# Patient Record
Sex: FEMALE | Race: OTHER | HISPANIC OR LATINO | Employment: UNEMPLOYED | URBAN - METROPOLITAN AREA
[De-identification: names, ages, dates, MRNs, and addresses within clinical notes are randomized per-mention and may not be internally consistent; named-entity substitution may affect disease eponyms.]

---

## 2017-04-18 ENCOUNTER — GENERIC CONVERSION - ENCOUNTER (OUTPATIENT)
Dept: OTHER | Facility: OTHER | Age: 47
End: 2017-04-18
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
## 2018-07-26 ENCOUNTER — HOSPITAL ENCOUNTER (EMERGENCY)
Facility: HOSPITAL | Age: 48
Discharge: HOME | End: 2018-07-26
Attending: EMERGENCY MEDICINE | Admitting: EMERGENCY MEDICINE
Payer: COMMERCIAL

## 2018-07-26 VITALS
DIASTOLIC BLOOD PRESSURE: 75 MMHG | SYSTOLIC BLOOD PRESSURE: 118 MMHG | OXYGEN SATURATION: 100 % | HEART RATE: 90 BPM | RESPIRATION RATE: 16 BRPM | TEMPERATURE: 97.8 F

## 2018-07-26 DIAGNOSIS — L02.91 ABSCESS: Primary | ICD-10-CM

## 2018-07-26 PROCEDURE — 99282 EMERGENCY DEPT VISIT SF MDM: CPT

## 2018-07-26 RX ORDER — CEPHALEXIN 500 MG/1
500 CAPSULE ORAL 4 TIMES DAILY
Qty: 40 CAPSULE | Refills: 0 | Status: SHIPPED | OUTPATIENT
Start: 2018-07-26 | End: 2018-08-05

## 2018-07-26 RX ORDER — SULFAMETHOXAZOLE AND TRIMETHOPRIM 800; 160 MG/1; MG/1
1 TABLET ORAL 2 TIMES DAILY
Qty: 20 TABLET | Refills: 0 | Status: SHIPPED | OUTPATIENT
Start: 2018-07-26 | End: 2018-08-05

## 2018-07-26 NOTE — DISCHARGE INSTRUCTIONS
Abscess   WHAT YOU NEED TO KNOW:   A warm compress may help your abscess drain  Your healthcare provider may make a cut in the abscess so it can drain  You may need surgery to remove an abscess that is on your hands or buttocks  DISCHARGE INSTRUCTIONS:   Return to the emergency department if:   · The area around your abscess becomes very painful, warm, or has red streaks  · You have a fever and chills  · Your heart is beating faster than usual      · You feel faint or confused  Contact your healthcare provider if:   · Your abscess gets bigger or does not get better  · Your abscess returns  · You have questions or concerns about your condition or care  Medicines: You may  need any of the following:  · Antibiotics  help treat a bacterial infection  · Acetaminophen  decreases pain and fever  It is available without a doctor's order  Ask how much to take and how often to take it  Follow directions  Acetaminophen can cause liver damage if not taken correctly  · NSAIDs , such as ibuprofen, help decrease swelling, pain, and fever  This medicine is available with or without a doctor's order  NSAIDs can cause stomach bleeding or kidney problems in certain people  If you take blood thinner medicine, always ask your healthcare provider if NSAIDs are safe for you  Always read the medicine label and follow directions  · Take your medicine as directed  Contact your healthcare provider if you think your medicine is not helping or if you have side effects  Tell him or her if you are allergic to any medicine  Keep a list of the medicines, vitamins, and herbs you take  Include the amounts, and when and why you take them  Bring the list or the pill bottles to follow-up visits  Carry your medicine list with you in case of an emergency  Self-care:   · Apply a warm compress to your abscess  This will help it open and drain  Wet a washcloth in warm, but not hot, water  Apply the compress for 10 minutes  Repeat this 4 times each day  Do not  press on an abscess or try to open it with a needle  You may push the bacteria deeper or into your blood  · Do not share your clothes, towels, or sheets with anyone  This can spread the infection to others  · Wash your hands often  This can help prevent the spread of germs  Use soap and water or an alcohol-based hand rub  Care for your wound after it is drained:   · Care for your wound as directed  If your healthcare provider says it is okay, carefully remove the bandage and gauze packing  You may need to soak the gauze to get it out of your wound  Clean your wound and the area around it as directed  Dry the area and put on new, clean bandages  Change your bandages when they get wet or dirty  · Ask your healthcare provider how to change the gauze in your wound  Keep track of how many pieces of gauze are placed inside the wound  Do not put too much packing in the wound  Do not pack the gauze too tightly in your wound  Follow up with your healthcare provider in 1 to 3 days: You may need to have your packing removed or your bandage changed  Write down your questions so you remember to ask them during your visits  © 2017 [employer]  Information is for End User's use only and may not be sold, redistributed or otherwise used for commercial purposes  All illustrations and images included in CareNotes® are the copyrighted property of [employer] , Jobal[employer]  or Aamir Gilmore  The above information is an  only  It is not intended as medical advice for individual conditions or treatments  Talk to your doctor, nurse or pharmacist before following any medical regimen to see if it is safe and effective for you

## 2018-07-26 NOTE — ED PROVIDER NOTES
History  Chief Complaint   Patient presents with    Spider Bite     "A spider bit me on my left ankle, and now my left side of my body is hurting  Im not sure if i got poisoned"  This is a 44-year-old female patient who woke up with what she considers a spider bite to her left medial lower leg  This occurred approximately 4 days ago  She does not recall being bitten  She attempted to drain with the pain made it worse  She now has pain over the area in question appears to be an abscess and moves up her left leg a bit but not the left side of her body  No fever chills or systemic symptoms no other problems  It does appear the patient has a non fluctuant mild indurated abscess left medial ankle  Nothing open            None       Past Medical History:   Diagnosis Date    Asthma        No past surgical history on file  No family history on file  I have reviewed and agree with the history as documented  Social History   Substance Use Topics    Smoking status: Never Smoker    Smokeless tobacco: Never Used    Alcohol use No        Review of Systems   All other systems reviewed and are negative  Physical Exam  Physical Exam   Constitutional: She appears well-developed and well-nourished  HENT:   Head: Normocephalic and atraumatic  Right Ear: External ear normal    Left Ear: External ear normal    Nose: Nose normal    Mouth/Throat: Oropharynx is clear and moist    Eyes: Conjunctivae are normal  Pupils are equal, round, and reactive to light  Neck: Normal range of motion  Neck supple  Cardiovascular: Normal rate and regular rhythm  Pulmonary/Chest: Effort normal and breath sounds normal    Abdominal: Soft  Bowel sounds are normal  There is no tenderness  Musculoskeletal: Normal range of motion  Feet:    Neurological: She is alert  Skin: Skin is warm  Psychiatric: She has a normal mood and affect  Her behavior is normal    Nursing note and vitals reviewed        Vital Signs  ED Triage Vitals [07/26/18 1157]   Temperature Pulse Respirations Blood Pressure SpO2   97 8 °F (36 6 °C) 90 16 118/75 100 %      Temp src Heart Rate Source Patient Position - Orthostatic VS BP Location FiO2 (%)   -- -- -- -- --      Pain Score       8           Vitals:    07/26/18 1157   BP: 118/75   Pulse: 90       Visual Acuity      ED Medications  Medications - No data to display    Diagnostic Studies  Results Reviewed     None                 No orders to display              Procedures  Procedures       Phone Contacts  ED Phone Contact    ED Course                               MDM  CritCare Time    Disposition  Final diagnoses:   Abscess     Time reflects when diagnosis was documented in both MDM as applicable and the Disposition within this note     Time User Action Codes Description Comment    7/26/2018 12:20 PM [place], [facility] Add [L02 [id number]] Abscess       ED Disposition     ED Disposition Condition Comment    Discharge  [facility] discharge to home/self care      Condition at discharge: Good        Follow-up Information     Follow up With Specialties Details Why Lizet Manley MD Family Medicine Schedule an appointment as soon as possible for a visit  [address] [place] [id number] [phone]            Patient's Medications   Discharge Prescriptions    CEPHALEXIN (KEFLEX) 500 MG CAPSULE    Take 1 capsule (500 mg total) by mouth 4 (four) times a day for 10 days       Start Date: 7/26/2018 End Date: 8/5/2018       Order Dose: 500 mg       Quantity: 40 capsule    Refills: 0    DICLOFENAC SODIUM (VOLTAREN) 50 MG EC TABLET    Take 1 tablet (50 mg total) by mouth 2 (two) times a day       Start Date: 7/26/2018 End Date: --       Order Dose: 50 mg       Quantity: 10 tablet    Refills: 0    SULFAMETHOXAZOLE-TRIMETHOPRIM (BACTRIM DS) 800-160 MG PER TABLET    Take 1 tablet by mouth 2 (two) times a day for 10 days smx-tmp DS (BACTRIM) 800-160 mg tabs (1tab q12 D10)       Start Date: 7/26/2018 End Date: 8/5/2018       Order Dose: 1 tablet       Quantity: 20 tablet    Refills: 0     No discharge procedures on file      ED Provider  Electronically Signed by           Philip Veliz PA-C  07/26/18 1221

## 2019-06-20 ENCOUNTER — APPOINTMENT (EMERGENCY)
Dept: CT IMAGING | Facility: HOSPITAL | Age: 49
End: 2019-06-20
Payer: COMMERCIAL

## 2019-06-20 ENCOUNTER — HOSPITAL ENCOUNTER (EMERGENCY)
Facility: HOSPITAL | Age: 49
Discharge: HOME | End: 2019-06-20
Attending: EMERGENCY MEDICINE | Admitting: EMERGENCY MEDICINE
Payer: COMMERCIAL

## 2019-06-20 VITALS
TEMPERATURE: 98.4 F | OXYGEN SATURATION: 98 % | RESPIRATION RATE: 18 BRPM | WEIGHT: 145.28 LBS | HEART RATE: 68 BPM | DIASTOLIC BLOOD PRESSURE: 88 MMHG | SYSTOLIC BLOOD PRESSURE: 136 MMHG

## 2019-06-20 DIAGNOSIS — K52.9 COLITIS: Primary | ICD-10-CM

## 2019-06-20 LAB
ALBUMIN SERPL BCP-MCNC: 4.1 G/DL (ref 3–5.2)
ALP SERPL-CCNC: 63 U/L (ref 43–122)
ALT SERPL W P-5'-P-CCNC: 17 U/L (ref 9–52)
ANION GAP SERPL CALCULATED.3IONS-SCNC: 8 MMOL/L (ref 5–14)
AST SERPL W P-5'-P-CCNC: 16 U/L (ref 14–36)
BACTERIA UR QL AUTO: ABNORMAL /HPF
BASOPHILS # BLD AUTO: 0 THOUSANDS/ΜL (ref 0–0.1)
BASOPHILS NFR BLD AUTO: 1 % (ref 0–1)
BILIRUB SERPL-MCNC: 0.8 MG/DL
BILIRUB UR QL STRIP: NEGATIVE
BUN SERPL-MCNC: 15 MG/DL (ref 5–25)
CALCIUM SERPL-MCNC: 8.9 MG/DL (ref 8.4–10.2)
CHLORIDE SERPL-SCNC: 106 MMOL/L (ref 97–108)
CLARITY UR: CLEAR
CO2 SERPL-SCNC: 28 MMOL/L (ref 22–30)
COLOR UR: ABNORMAL
CREAT SERPL-MCNC: 0.76 MG/DL (ref 0.6–1.2)
EOSINOPHIL # BLD AUTO: 0.4 THOUSAND/ΜL (ref 0–0.4)
EOSINOPHIL NFR BLD AUTO: 7 % (ref 0–6)
ERYTHROCYTE [DISTWIDTH] IN BLOOD BY AUTOMATED COUNT: 12.7 %
EXT PREG TEST URINE: NEGATIVE
EXT. CONTROL ED NAV: NORMAL
GFR SERPL CREATININE-BSD FRML MDRD: 94 ML/MIN/1.73SQ M
GLUCOSE SERPL-MCNC: 79 MG/DL (ref 70–99)
GLUCOSE UR STRIP-MCNC: NEGATIVE MG/DL
HCT VFR BLD AUTO: 40.9 % (ref 36–46)
HGB BLD-MCNC: 13.7 G/DL (ref 12–16)
HGB UR QL STRIP.AUTO: 25
KETONES UR STRIP-MCNC: NEGATIVE MG/DL
LEUKOCYTE ESTERASE UR QL STRIP: NEGATIVE
LIPASE SERPL-CCNC: 107 U/L (ref 23–300)
LYMPHOCYTES # BLD AUTO: 1.6 THOUSANDS/ΜL (ref 0.5–4)
LYMPHOCYTES NFR BLD AUTO: 26 % (ref 25–45)
MCH RBC QN AUTO: 31.8 PG (ref 26–34)
MCHC RBC AUTO-ENTMCNC: 33.5 G/DL (ref 31–36)
MCV RBC AUTO: 95 FL (ref 80–100)
MONOCYTES # BLD AUTO: 0.5 THOUSAND/ΜL (ref 0.2–0.9)
MONOCYTES NFR BLD AUTO: 8 % (ref 1–10)
NEUTROPHILS # BLD AUTO: 3.5 THOUSANDS/ΜL (ref 1.8–7.8)
NEUTS SEG NFR BLD AUTO: 58 % (ref 45–65)
NITRITE UR QL STRIP: NEGATIVE
NON-SQ EPI CELLS URNS QL MICRO: ABNORMAL /HPF
PH UR STRIP.AUTO: 7 [PH]
PLATELET # BLD AUTO: 264 THOUSANDS/UL (ref 150–450)
PMV BLD AUTO: 9.6 FL (ref 8.9–12.7)
POTASSIUM SERPL-SCNC: 4.1 MMOL/L (ref 3.6–5)
PROT SERPL-MCNC: 7.3 G/DL (ref 5.9–8.4)
PROT UR STRIP-MCNC: NEGATIVE MG/DL
RBC # BLD AUTO: 4.3 MILLION/UL (ref 4–5.2)
RBC #/AREA URNS AUTO: ABNORMAL /HPF
SODIUM SERPL-SCNC: 142 MMOL/L (ref 137–147)
SP GR UR STRIP.AUTO: 1.01 (ref 1–1.04)
UROBILINOGEN UA: NEGATIVE MG/DL
WBC # BLD AUTO: 6.1 THOUSAND/UL (ref 4.5–11)
WBC #/AREA URNS AUTO: ABNORMAL /HPF

## 2019-06-20 PROCEDURE — 36415 COLL VENOUS BLD VENIPUNCTURE: CPT | Performed by: PHYSICIAN ASSISTANT

## 2019-06-20 PROCEDURE — 81025 URINE PREGNANCY TEST: CPT | Performed by: PHYSICIAN ASSISTANT

## 2019-06-20 PROCEDURE — 96374 THER/PROPH/DIAG INJ IV PUSH: CPT

## 2019-06-20 PROCEDURE — 99284 EMERGENCY DEPT VISIT MOD MDM: CPT

## 2019-06-20 PROCEDURE — 96375 TX/PRO/DX INJ NEW DRUG ADDON: CPT

## 2019-06-20 PROCEDURE — 99284 EMERGENCY DEPT VISIT MOD MDM: CPT | Performed by: PHYSICIAN ASSISTANT

## 2019-06-20 PROCEDURE — 81003 URINALYSIS AUTO W/O SCOPE: CPT | Performed by: PHYSICIAN ASSISTANT

## 2019-06-20 PROCEDURE — 80053 COMPREHEN METABOLIC PANEL: CPT | Performed by: PHYSICIAN ASSISTANT

## 2019-06-20 PROCEDURE — 85025 COMPLETE CBC W/AUTO DIFF WBC: CPT | Performed by: PHYSICIAN ASSISTANT

## 2019-06-20 PROCEDURE — 74177 CT ABD & PELVIS W/CONTRAST: CPT

## 2019-06-20 PROCEDURE — 81001 URINALYSIS AUTO W/SCOPE: CPT | Performed by: PHYSICIAN ASSISTANT

## 2019-06-20 PROCEDURE — 96361 HYDRATE IV INFUSION ADD-ON: CPT

## 2019-06-20 PROCEDURE — 83690 ASSAY OF LIPASE: CPT | Performed by: PHYSICIAN ASSISTANT

## 2019-06-20 RX ORDER — KETOROLAC TROMETHAMINE 30 MG/ML
15 INJECTION, SOLUTION INTRAMUSCULAR; INTRAVENOUS ONCE
Status: COMPLETED | OUTPATIENT
Start: 2019-06-20 | End: 2019-06-20

## 2019-06-20 RX ORDER — RANITIDINE 150 MG/1
150 TABLET ORAL 2 TIMES DAILY
Qty: 60 TABLET | Refills: 0 | Status: SHIPPED | OUTPATIENT
Start: 2019-06-20 | End: 2020-02-28

## 2019-06-20 RX ORDER — SODIUM CHLORIDE 9 MG/ML
1000 INJECTION, SOLUTION INTRAVENOUS CONTINUOUS
Status: DISCONTINUED | OUTPATIENT
Start: 2019-06-20 | End: 2019-06-20 | Stop reason: HOSPADM

## 2019-06-20 RX ORDER — ALBUTEROL SULFATE 90 UG/1
2 AEROSOL, METERED RESPIRATORY (INHALATION) EVERY 6 HOURS PRN
COMMUNITY
End: 2022-08-09 | Stop reason: SDUPTHER

## 2019-06-20 RX ORDER — ONDANSETRON 2 MG/ML
4 INJECTION INTRAMUSCULAR; INTRAVENOUS ONCE
Status: COMPLETED | OUTPATIENT
Start: 2019-06-20 | End: 2019-06-20

## 2019-06-20 RX ORDER — ONDANSETRON 4 MG/1
4 TABLET, FILM COATED ORAL EVERY 6 HOURS
Qty: 12 TABLET | Refills: 0 | Status: SHIPPED | OUTPATIENT
Start: 2019-06-20 | End: 2020-02-28

## 2019-06-20 RX ADMIN — ONDANSETRON 4 MG: 2 INJECTION, SOLUTION INTRAMUSCULAR; INTRAVENOUS at 11:24

## 2019-06-20 RX ADMIN — IOHEXOL 50 ML: 240 INJECTION, SOLUTION INTRATHECAL; INTRAVASCULAR; INTRAVENOUS; ORAL at 12:15

## 2019-06-20 RX ADMIN — KETOROLAC TROMETHAMINE 15 MG: 30 INJECTION, SOLUTION INTRAMUSCULAR; INTRAVENOUS at 11:24

## 2019-06-20 RX ADMIN — IOHEXOL 100 ML: 350 INJECTION, SOLUTION INTRAVENOUS at 14:09

## 2019-06-20 RX ADMIN — SODIUM CHLORIDE 1000 ML/HR: 9 INJECTION, SOLUTION INTRAVENOUS at 11:20

## 2019-09-20 ENCOUNTER — APPOINTMENT (EMERGENCY)
Dept: RADIOLOGY | Facility: HOSPITAL | Age: 49
End: 2019-09-20
Payer: COMMERCIAL

## 2019-09-20 ENCOUNTER — HOSPITAL ENCOUNTER (EMERGENCY)
Facility: HOSPITAL | Age: 49
Discharge: HOME | End: 2019-09-20
Attending: EMERGENCY MEDICINE | Admitting: EMERGENCY MEDICINE
Payer: COMMERCIAL

## 2019-09-20 ENCOUNTER — APPOINTMENT (EMERGENCY)
Dept: CT IMAGING | Facility: HOSPITAL | Age: 49
End: 2019-09-20
Payer: COMMERCIAL

## 2019-09-20 VITALS
SYSTOLIC BLOOD PRESSURE: 130 MMHG | RESPIRATION RATE: 16 BRPM | HEART RATE: 71 BPM | DIASTOLIC BLOOD PRESSURE: 87 MMHG | WEIGHT: 135 LBS | OXYGEN SATURATION: 99 %

## 2019-09-20 DIAGNOSIS — M62.838 MUSCLE SPASMS OF NECK: Primary | ICD-10-CM
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
## 2019-09-20 LAB
EXT PREG TEST URINE: NEGATIVE
EXT. CONTROL ED NAV: NORMAL

## 2019-09-20 PROCEDURE — 72125 CT NECK SPINE W/O DYE: CPT

## 2019-09-20 PROCEDURE — 96372 THER/PROPH/DIAG INJ SC/IM: CPT

## 2019-09-20 PROCEDURE — 99284 EMERGENCY DEPT VISIT MOD MDM: CPT

## 2019-09-20 PROCEDURE — 71045 X-RAY EXAM CHEST 1 VIEW: CPT

## 2019-09-20 PROCEDURE — 73030 X-RAY EXAM OF SHOULDER: CPT

## 2019-09-20 PROCEDURE — 70450 CT HEAD/BRAIN W/O DYE: CPT

## 2019-09-20 PROCEDURE — 81025 URINE PREGNANCY TEST: CPT | Performed by: EMERGENCY MEDICINE

## 2019-09-20 PROCEDURE — 99285 EMERGENCY DEPT VISIT HI MDM: CPT | Performed by: EMERGENCY MEDICINE

## 2019-09-20 RX ORDER — ONDANSETRON 4 MG/1
4 TABLET, ORALLY DISINTEGRATING ORAL ONCE
Status: COMPLETED | OUTPATIENT
Start: 2019-09-20 | End: 2019-09-20

## 2019-09-20 RX ORDER — CYCLOBENZAPRINE HCL 10 MG
10 TABLET ORAL 2 TIMES DAILY PRN
Qty: 20 TABLET | Refills: 0 | Status: SHIPPED | OUTPATIENT
Start: 2019-09-20 | End: 2020-02-28

## 2019-09-20 RX ORDER — MORPHINE SULFATE 4 MG/ML
4 INJECTION, SOLUTION INTRAMUSCULAR; INTRAVENOUS ONCE
Status: COMPLETED | OUTPATIENT
Start: 2019-09-20 | End: 2019-09-20

## 2019-09-20 RX ORDER — IBUPROFEN 600 MG/1
600 TABLET ORAL EVERY 6 HOURS PRN
Qty: 30 TABLET | Refills: 0 | Status: SHIPPED | OUTPATIENT
Start: 2019-09-20 | End: 2020-02-28

## 2019-09-20 RX ADMIN — MORPHINE SULFATE 4 MG: 4 INJECTION INTRAVENOUS at 15:24

## 2019-09-20 RX ADMIN — ONDANSETRON 4 MG: 4 TABLET, ORALLY DISINTEGRATING ORAL at 16:13
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
## 2019-09-20 NOTE — ED NOTES
Patient transported to [facility], [address] [place] [address]  09/20/19 2653

## 2020-02-28 ENCOUNTER — HOSPITAL ENCOUNTER (EMERGENCY)
Facility: HOSPITAL | Age: 50
Discharge: HOME | End: 2020-02-28
Attending: EMERGENCY MEDICINE | Admitting: EMERGENCY MEDICINE
Payer: COMMERCIAL

## 2020-02-28 VITALS
TEMPERATURE: 98 F | HEART RATE: 75 BPM | DIASTOLIC BLOOD PRESSURE: 73 MMHG | RESPIRATION RATE: 18 BRPM | SYSTOLIC BLOOD PRESSURE: 169 MMHG | WEIGHT: 145.94 LBS | OXYGEN SATURATION: 100 %

## 2020-02-28 DIAGNOSIS — B34.9 VIRAL ILLNESS: Primary | ICD-10-CM

## 2020-02-28 LAB
ANION GAP SERPL CALCULATED.3IONS-SCNC: 9 MMOL/L (ref 4–13)
BACTERIA UR QL AUTO: ABNORMAL /HPF
BASOPHILS # BLD AUTO: 0.04 THOUSANDS/ΜL (ref 0–0.1)
BASOPHILS NFR BLD AUTO: 1 % (ref 0–1)
BILIRUB UR QL STRIP: NEGATIVE
BUN SERPL-MCNC: 16 MG/DL (ref 5–25)
CALCIUM SERPL-MCNC: 8.7 MG/DL (ref 8.3–10.1)
CHLORIDE SERPL-SCNC: 105 MMOL/L (ref 100–108)
CLARITY UR: CLEAR
CO2 SERPL-SCNC: 28 MMOL/L (ref 21–32)
COLOR UR: YELLOW
COLOR, POC: YELLOW
CREAT SERPL-MCNC: 0.9 MG/DL (ref 0.6–1.3)
EOSINOPHIL # BLD AUTO: 0.21 THOUSAND/ΜL (ref 0–0.61)
EOSINOPHIL NFR BLD AUTO: 3 % (ref 0–6)
ERYTHROCYTE [DISTWIDTH] IN BLOOD BY AUTOMATED COUNT: 12.1 % (ref 11.6–15.1)
EXT PREG TEST URINE: NEGATIVE
EXT. CONTROL ED NAV: NORMAL
GFR SERPL CREATININE-BSD FRML MDRD: 76 ML/MIN/1.73SQ M
GLUCOSE SERPL-MCNC: 89 MG/DL (ref 65–140)
GLUCOSE UR STRIP-MCNC: NEGATIVE MG/DL
HCT VFR BLD AUTO: 43.1 % (ref 34.8–46.1)
HGB BLD-MCNC: 13.7 G/DL (ref 11.5–15.4)
HGB UR QL STRIP.AUTO: ABNORMAL
IMM GRANULOCYTES # BLD AUTO: 0.02 THOUSAND/UL (ref 0–0.2)
IMM GRANULOCYTES NFR BLD AUTO: 0 % (ref 0–2)
KETONES UR STRIP-MCNC: NEGATIVE MG/DL
LEUKOCYTE ESTERASE UR QL STRIP: ABNORMAL
LYMPHOCYTES # BLD AUTO: 1.65 THOUSANDS/ΜL (ref 0.6–4.47)
LYMPHOCYTES NFR BLD AUTO: 22 % (ref 14–44)
MCH RBC QN AUTO: 31.4 PG (ref 26.8–34.3)
MCHC RBC AUTO-ENTMCNC: 31.8 G/DL (ref 31.4–37.4)
MCV RBC AUTO: 99 FL (ref 82–98)
MONOCYTES # BLD AUTO: 0.55 THOUSAND/ΜL (ref 0.17–1.22)
MONOCYTES NFR BLD AUTO: 7 % (ref 4–12)
NEUTROPHILS # BLD AUTO: 5.15 THOUSANDS/ΜL (ref 1.85–7.62)
NEUTS SEG NFR BLD AUTO: 67 % (ref 43–75)
NITRITE UR QL STRIP: NEGATIVE
NON-SQ EPI CELLS URNS QL MICRO: ABNORMAL /HPF
NRBC BLD AUTO-RTO: 0 /100 WBCS
PH UR STRIP.AUTO: 7 [PH] (ref 4.5–8)
PLATELET # BLD AUTO: 310 THOUSANDS/UL (ref 149–390)
PMV BLD AUTO: 10.4 FL (ref 8.9–12.7)
POTASSIUM SERPL-SCNC: 3.2 MMOL/L (ref 3.5–5.3)
PROT UR STRIP-MCNC: NEGATIVE MG/DL
RBC # BLD AUTO: 4.37 MILLION/UL (ref 3.81–5.12)
RBC #/AREA URNS AUTO: ABNORMAL /HPF
SODIUM SERPL-SCNC: 142 MMOL/L (ref 136–145)
SP GR UR STRIP.AUTO: 1.02 (ref 1–1.03)
UROBILINOGEN UR QL STRIP.AUTO: 1 E.U./DL
WBC # BLD AUTO: 7.62 THOUSAND/UL (ref 4.31–10.16)
WBC #/AREA URNS AUTO: ABNORMAL /HPF

## 2020-02-28 PROCEDURE — 96374 THER/PROPH/DIAG INJ IV PUSH: CPT

## 2020-02-28 PROCEDURE — 99284 EMERGENCY DEPT VISIT MOD MDM: CPT

## 2020-02-28 PROCEDURE — 96361 HYDRATE IV INFUSION ADD-ON: CPT

## 2020-02-28 PROCEDURE — 81025 URINE PREGNANCY TEST: CPT | Performed by: EMERGENCY MEDICINE

## 2020-02-28 PROCEDURE — 96375 TX/PRO/DX INJ NEW DRUG ADDON: CPT

## 2020-02-28 PROCEDURE — 36415 COLL VENOUS BLD VENIPUNCTURE: CPT | Performed by: EMERGENCY MEDICINE

## 2020-02-28 PROCEDURE — 99284 EMERGENCY DEPT VISIT MOD MDM: CPT | Performed by: EMERGENCY MEDICINE

## 2020-02-28 PROCEDURE — 85025 COMPLETE CBC W/AUTO DIFF WBC: CPT | Performed by: EMERGENCY MEDICINE

## 2020-02-28 PROCEDURE — 81001 URINALYSIS AUTO W/SCOPE: CPT

## 2020-02-28 PROCEDURE — 80048 BASIC METABOLIC PNL TOTAL CA: CPT | Performed by: EMERGENCY MEDICINE

## 2020-02-28 RX ORDER — ONDANSETRON 2 MG/ML
4 INJECTION INTRAMUSCULAR; INTRAVENOUS ONCE
Status: COMPLETED | OUTPATIENT
Start: 2020-02-28 | End: 2020-02-28

## 2020-02-28 RX ORDER — NAPROXEN 500 MG/1
500 TABLET ORAL EVERY 12 HOURS PRN
Qty: 15 TABLET | Refills: 0 | Status: SHIPPED | OUTPATIENT
Start: 2020-02-28

## 2020-02-28 RX ORDER — KETOROLAC TROMETHAMINE 30 MG/ML
15 INJECTION, SOLUTION INTRAMUSCULAR; INTRAVENOUS ONCE
Status: COMPLETED | OUTPATIENT
Start: 2020-02-28 | End: 2020-02-28

## 2020-02-28 RX ORDER — ONDANSETRON 4 MG/1
4 TABLET, ORALLY DISINTEGRATING ORAL EVERY 8 HOURS PRN
Qty: 10 TABLET | Refills: 0 | Status: SHIPPED | OUTPATIENT
Start: 2020-02-28 | End: 2022-03-08 | Stop reason: SDUPTHER

## 2020-02-28 RX ADMIN — SODIUM CHLORIDE 1000 ML: 0.9 INJECTION, SOLUTION INTRAVENOUS at 09:45

## 2020-02-28 RX ADMIN — KETOROLAC TROMETHAMINE 15 MG: 30 INJECTION, SOLUTION INTRAMUSCULAR at 09:46

## 2020-02-28 RX ADMIN — ONDANSETRON 4 MG: 2 INJECTION INTRAMUSCULAR; INTRAVENOUS at 09:46

## 2020-02-28 NOTE — ED PROVIDER NOTES
History  Chief Complaint   Patient presents with    Abdominal Pain     Pt c/o abdominal pain, "my vagina feels like it's going to explode", throat pain, back pain  states she was seen at other ERs and her PCP for same complaints  17-year-old female with a past medical history significant for asthma presents with a 2 day history of diffuse body aches, feeling like her eyes are hot, nausea and vomiting  No documented fevers at home  No chills  No cough  No diarrhea  She complains of diffuse crampy abdominal pain     Patient did not take anything for her symptoms  History provided by:  Patient and relative   used: Yes    Flu Symptoms   Presenting symptoms: myalgias, nausea and vomiting    Presenting symptoms: no cough, no diarrhea, no fatigue, no fever, no headaches, no rhinorrhea, no shortness of breath and no sore throat    Vomiting:     Quality:  Stomach contents    Number of occurrences:  4    Timing:  Intermittent    Progression:  Unchanged  Severity:  Unable to specify  Onset quality:  Gradual  Duration:  2 days  Progression:  Unchanged  Chronicity:  New  Relieved by:  None tried  Worsened by:  Nothing  Ineffective treatments:  None tried  Associated symptoms: decreased appetite and decreased physical activity    Associated symptoms: no chills, no ear pain, no mental status change, no congestion, no neck stiffness and no syncope    Risk factors: not elderly, no diabetes problem, no heart disease, no immunocompromised state, no kidney disease, no liver disease, not pregnant and no sick contacts        Prior to Admission Medications   Prescriptions Last Dose Informant Patient Reported? Taking?    albuterol (PROVENTIL HFA,VENTOLIN HFA) 90 mcg/act inhaler   Yes Yes   Sig: Inhale 2 puffs every 6 (six) hours as needed for wheezing      Facility-Administered Medications: None       Past Medical History:   Diagnosis Date    Asthma        Past Surgical History:   Procedure Laterality Date    ABDOMINAL SURGERY         History reviewed  No pertinent family history  I have reviewed and agree with the history as documented  E-Cigarette/Vaping     E-Cigarette/Vaping Substances     Social History     Tobacco Use    Smoking status: Never Smoker    Smokeless tobacco: Never Used   Substance Use Topics    Alcohol use: No    Drug use: No       Review of Systems   Constitutional: Positive for activity change, appetite change and decreased appetite  Negative for chills, diaphoresis, fatigue and fever  HENT: Negative  Negative for congestion, ear pain, rhinorrhea and sore throat  Eyes: Negative  Negative for discharge, redness and itching  Respiratory: Negative  Negative for apnea, cough, chest tightness, shortness of breath and wheezing  Cardiovascular: Negative for chest pain, palpitations and leg swelling  Gastrointestinal: Positive for nausea and vomiting  Negative for abdominal pain and diarrhea  Endocrine: Negative  Genitourinary: Negative  Negative for flank pain, frequency and urgency  Musculoskeletal: Positive for myalgias  Negative for back pain and neck stiffness  Skin: Negative  Allergic/Immunologic: Negative  Neurological: Negative  Negative for dizziness, syncope, weakness, light-headedness, numbness and headaches  Hematological: Negative  All other systems reviewed and are negative  Physical Exam  Physical Exam   Constitutional: She is oriented to person, place, and time  She appears well-developed and well-nourished  Non-toxic appearance  She does not have a sickly appearance  She does not appear ill  No distress  HENT:   Head: Normocephalic and atraumatic  Right Ear: Tympanic membrane and external ear normal    Left Ear: Tympanic membrane and external ear normal    Mouth/Throat: Oropharynx is clear and moist  No oropharyngeal exudate  Eyes: Pupils are equal, round, and reactive to light   Conjunctivae and EOM are normal  Right eye exhibits no discharge  Left eye exhibits no discharge  No scleral icterus  Neck: Normal range of motion  Neck supple  Cardiovascular: Normal rate, regular rhythm and normal heart sounds  Exam reveals no gallop and no friction rub  No murmur heard  Pulmonary/Chest: Effort normal and breath sounds normal  No stridor  No respiratory distress  She has no wheezes  She has no rales  She exhibits no tenderness  Abdominal: Soft  Bowel sounds are normal  She exhibits no distension and no mass  There is no tenderness  No hernia  Musculoskeletal: Normal range of motion  She exhibits no edema, tenderness or deformity  Lymphadenopathy:     She has no cervical adenopathy  Neurological: She is alert and oriented to person, place, and time  She has normal reflexes  She exhibits normal muscle tone  Skin: Skin is warm and dry  No rash noted  She is not diaphoretic  No erythema  No pallor  Psychiatric: She has a normal mood and affect  Nursing note and vitals reviewed        Vital Signs  ED Triage Vitals [02/28/20 0909]   Temperature Pulse Respirations Blood Pressure SpO2   98 °F (36 7 °C) 75 18 169/73 100 %      Temp Source Heart Rate Source Patient Position - Orthostatic VS BP Location FiO2 (%)   Temporal Monitor Sitting Right arm --      Pain Score       8           Vitals:    02/28/20 0909   BP: 169/73   Pulse: 75   Patient Position - Orthostatic VS: Sitting         Visual Acuity      ED Medications  Medications   sodium chloride 0 9 % bolus 1,000 mL (0 mL Intravenous Stopped 2/28/20 1040)   ondansetron (ZOFRAN) injection 4 mg (4 mg Intravenous Given 2/28/20 0946)   ketorolac (TORADOL) injection 15 mg (15 mg Intravenous Given 2/28/20 0946)       Diagnostic Studies  Results Reviewed     Procedure Component Value Units Date/Time    Urine Microscopic [[id number]]  (Abnormal) Collected:  02/28/20 0938    Lab Status:  Final result Specimen:  Urine, Clean Catch Updated:  02/28/20 1012     RBC, UA 30-50 /hpf      WBC, UA 0-1 /hpf      Epithelial Cells Occasional /hpf      Bacteria, UA Occasional /hpf     Basic metabolic panel [[id number]]  (Abnormal) Collected:  02/28/20 0944    Lab Status:  Final result Specimen:  Blood from Arm, Right Updated:  02/28/20 1002     Sodium 142 mmol/L      Potassium 3 2 mmol/L      Chloride 105 mmol/L      CO2 28 mmol/L      ANION GAP 9 mmol/L      BUN 16 mg/dL      Creatinine 0 90 mg/dL      Glucose 89 mg/dL      Calcium 8 7 mg/dL      eGFR 76 ml/min/1 73sq m     Narrative:       [place] guidelines for Chronic Kidney Disease (CKD):     Stage 1 with normal or high GFR (GFR > 90 mL/min/1 73 square meters)    Stage 2 Mild CKD (GFR = 60-89 mL/min/1 73 square meters)    Stage 3A Moderate CKD (GFR = 45-59 mL/min/1 73 square meters)    Stage 3B Moderate CKD (GFR = 30-44 mL/min/1 73 square meters)    Stage 4 Severe CKD (GFR = 15-29 mL/min/1 73 square meters)    Stage 5 End Stage CKD (GFR <15 mL/min/1 73 square meters)  Note: GFR calculation is accurate only with a steady state creatinine    CBC and differential [[id number]]  (Abnormal) Collected:  02/28/20 0944    Lab Status:  Final result Specimen:  Blood from Arm, Right Updated:  02/28/20 0954     WBC 7 62 Thousand/uL      RBC 4 37 Million/uL      Hemoglobin 13 7 g/dL      Hematocrit 43 1 %      MCV 99 fL      MCH 31 4 pg      MCHC 31 8 g/dL      RDW 12 1 %      MPV 10 4 fL      Platelets 020 Thousands/uL      nRBC 0 /100 WBCs      Neutrophils Relative 67 %      Immat GRANS % 0 %      Lymphocytes Relative 22 %      Monocytes Relative 7 %      Eosinophils Relative 3 %      Basophils Relative 1 %      Neutrophils Absolute 5 15 Thousands/µL      Immature Grans Absolute 0 02 Thousand/uL      Lymphocytes Absolute 1 65 Thousands/µL      Monocytes Absolute 0 55 Thousand/µL      Eosinophils Absolute 0 21 Thousand/µL      Basophils Absolute 0 04 Thousands/µL     POCT pregnancy, urine [[id number]]  (Normal) Resulted:  02/28/20 0940 Lab Status:  Final result Updated:  02/28/20 0941     EXT PREG TEST UR (Ref: Negative) Negative     Control valid    POCT urinalysis dipstick [[id number]]  (Normal) Resulted:  02/28/20 0940    Lab Status:  Final result Specimen:  Urine Updated:  02/28/20 0940     Color, UA Yellow    Urine Macroscopic, POC [[id number]]  (Abnormal) Collected:  02/28/20 0938    Lab Status:  Final result Specimen:  Urine Updated:  02/28/20 0939     Color, UA Yellow     Clarity, UA Clear     pH, UA 7 0     Leukocytes, UA Trace     Nitrite, UA Negative     Protein, UA Negative mg/dl      Glucose, UA Negative mg/dl      Ketones, UA Negative mg/dl      Urobilinogen, UA 1 0 E U /dl      Bilirubin, UA Negative     Blood, UA Large     Specific Gravity, UA 1 025    Narrative:       CLINITEK RESULT                 No orders to display              Procedures  Procedures         ED Course  ED Course as of Feb 28 1442   Fri Feb 28, 2020   1027 Patient currently menstruating   RBC, UA(!): 30-50                               MDM  Number of Diagnoses or Management Options  Diagnosis management comments: 77-year-old female presents to the emergency department with a 2 day history of generalized body aches, abdominal cramping, vomiting  No diarrhea  No fevers  She did not take anything for her symptoms  On exam she is alert no acute distress  Exam here is normal   I suspect her symptoms are most likely due to a viral illness  Will check basic labs, urinalysis, rule out pregnancy  Will give IV fluids, Zofran for the nausea and Toradol for the body aches  Will reassess         Amount and/or Complexity of Data Reviewed  Clinical lab tests: ordered and reviewed  Independent visualization of images, tracings, or specimens: yes          Disposition  Final diagnoses:   Viral illness     Time reflects when diagnosis was documented in both MDM as applicable and the Disposition within this note     Time User Action Codes Description Comment    2/28/2020 10:27 AM Faina SINGH Add [B34 9] Viral illness       ED Disposition     ED Disposition Condition Date/Time Comment    Discharge Good Fri Feb 28, 2020 10:27 AM Jose Miguel Guillen discharge to home/self care  Follow-up Information     Follow up With Specialties Details Why Moises Hernandez MD Family Medicine Schedule an appointment as soon as possible for a visit in 2 days  [facility] [address] [phone]  [phone]            Discharge Medication List as of 2/28/2020 10:29 AM      START taking these medications    Details   naproxen (NAPROSYN) 500 mg tablet Take 1 tablet (500 mg total) by mouth every 12 (twelve) hours as needed for mild pain or moderate pain, Starting Fri 2/28/2020, Print      ondansetron (ZOFRAN-ODT) 4 mg disintegrating tablet Take 1 tablet (4 mg total) by mouth every 8 (eight) hours as needed for nausea or vomiting, Starting Fri 2/28/2020, Print         CONTINUE these medications which have NOT CHANGED    Details   albuterol (PROVENTIL HFA,VENTOLIN HFA) 90 mcg/act inhaler Inhale 2 puffs every 6 (six) hours as needed for wheezing, Historical Med           No discharge procedures on file      PDMP Review     None          ED Provider  Electronically Signed by           Flvaio Painting DO  02/28/20 4104

## 2021-01-18 ENCOUNTER — APPOINTMENT (EMERGENCY)
Dept: CT IMAGING | Facility: HOSPITAL | Age: 51
End: 2021-01-18
Payer: COMMERCIAL

## 2021-01-18 ENCOUNTER — HOSPITAL ENCOUNTER (EMERGENCY)
Facility: HOSPITAL | Age: 51
Discharge: HOME | End: 2021-01-18
Attending: EMERGENCY MEDICINE | Admitting: EMERGENCY MEDICINE
Payer: COMMERCIAL

## 2021-01-18 VITALS
OXYGEN SATURATION: 97 % | SYSTOLIC BLOOD PRESSURE: 106 MMHG | RESPIRATION RATE: 16 BRPM | HEART RATE: 87 BPM | DIASTOLIC BLOOD PRESSURE: 72 MMHG | TEMPERATURE: 99.3 F | WEIGHT: 152.78 LBS

## 2021-01-18 DIAGNOSIS — U07.1 COVID-19: Primary | ICD-10-CM

## 2021-01-18 DIAGNOSIS — R51.9 HEADACHE: ICD-10-CM

## 2021-01-18 DIAGNOSIS — E87.6 HYPOKALEMIA: ICD-10-CM

## 2021-01-18 DIAGNOSIS — R10.9 ABDOMINAL PAIN: ICD-10-CM

## 2021-01-18 DIAGNOSIS — M54.9 BACK PAIN: ICD-10-CM

## 2021-01-18 DIAGNOSIS — R19.7 DIARRHEA: ICD-10-CM

## 2021-01-18 DIAGNOSIS — R11.2 NAUSEA AND VOMITING: ICD-10-CM

## 2021-01-18 LAB
ALBUMIN SERPL BCP-MCNC: 3.8 G/DL (ref 3.5–5)
ALP SERPL-CCNC: 84 U/L (ref 46–116)
ALT SERPL W P-5'-P-CCNC: 26 U/L (ref 12–78)
ANION GAP SERPL CALCULATED.3IONS-SCNC: 10 MMOL/L (ref 4–13)
AST SERPL W P-5'-P-CCNC: 17 U/L (ref 5–45)
ATRIAL RATE: 81 BPM
BACTERIA UR QL AUTO: ABNORMAL /HPF
BASOPHILS # BLD AUTO: 0.01 THOUSANDS/ΜL (ref 0–0.1)
BASOPHILS NFR BLD AUTO: 0 % (ref 0–1)
BILIRUB SERPL-MCNC: 0.23 MG/DL (ref 0.2–1)
BILIRUB UR QL STRIP: NEGATIVE
BUN SERPL-MCNC: 12 MG/DL (ref 5–25)
CALCIUM SERPL-MCNC: 8.7 MG/DL (ref 8.3–10.1)
CHLORIDE SERPL-SCNC: 104 MMOL/L (ref 100–108)
CLARITY UR: CLEAR
CO2 SERPL-SCNC: 27 MMOL/L (ref 21–32)
COLOR UR: YELLOW
CREAT SERPL-MCNC: 0.95 MG/DL (ref 0.6–1.3)
D DIMER PPP FEU-MCNC: 0.42 UG/ML FEU
EOSINOPHIL # BLD AUTO: 0.01 THOUSAND/ΜL (ref 0–0.61)
EOSINOPHIL NFR BLD AUTO: 0 % (ref 0–6)
ERYTHROCYTE [DISTWIDTH] IN BLOOD BY AUTOMATED COUNT: 12.1 % (ref 11.6–15.1)
EXT PREG TEST URINE: NEGATIVE
EXT. CONTROL ED NAV: NORMAL
GFR SERPL CREATININE-BSD FRML MDRD: 71 ML/MIN/1.73SQ M
GLUCOSE SERPL-MCNC: 110 MG/DL (ref 65–140)
GLUCOSE UR STRIP-MCNC: NEGATIVE MG/DL
HCT VFR BLD AUTO: 42.7 % (ref 34.8–46.1)
HGB BLD-MCNC: 14.1 G/DL (ref 11.5–15.4)
HGB UR QL STRIP.AUTO: ABNORMAL
IMM GRANULOCYTES # BLD AUTO: 0.01 THOUSAND/UL (ref 0–0.2)
IMM GRANULOCYTES NFR BLD AUTO: 0 % (ref 0–2)
KETONES UR STRIP-MCNC: NEGATIVE MG/DL
LEUKOCYTE ESTERASE UR QL STRIP: NEGATIVE
LIPASE SERPL-CCNC: 176 U/L (ref 73–393)
LYMPHOCYTES # BLD AUTO: 1.76 THOUSANDS/ΜL (ref 0.6–4.47)
LYMPHOCYTES NFR BLD AUTO: 37 % (ref 14–44)
MCH RBC QN AUTO: 31.4 PG (ref 26.8–34.3)
MCHC RBC AUTO-ENTMCNC: 33 G/DL (ref 31.4–37.4)
MCV RBC AUTO: 95 FL (ref 82–98)
MONOCYTES # BLD AUTO: 0.51 THOUSAND/ΜL (ref 0.17–1.22)
MONOCYTES NFR BLD AUTO: 11 % (ref 4–12)
MUCOUS THREADS UR QL AUTO: ABNORMAL
NEUTROPHILS # BLD AUTO: 2.51 THOUSANDS/ΜL (ref 1.85–7.62)
NEUTS SEG NFR BLD AUTO: 52 % (ref 43–75)
NITRITE UR QL STRIP: NEGATIVE
NON-SQ EPI CELLS URNS QL MICRO: ABNORMAL /HPF
NRBC BLD AUTO-RTO: 0 /100 WBCS
P AXIS: 16 DEGREES
PH UR STRIP.AUTO: 6 [PH] (ref 4.5–8)
PLATELET # BLD AUTO: 248 THOUSANDS/UL (ref 149–390)
PMV BLD AUTO: 10.3 FL (ref 8.9–12.7)
POTASSIUM SERPL-SCNC: 2.9 MMOL/L (ref 3.5–5.3)
PR INTERVAL: 120 MS
PROT SERPL-MCNC: 8 G/DL (ref 6.4–8.2)
PROT UR STRIP-MCNC: ABNORMAL MG/DL
QRS AXIS: 45 DEGREES
QRSD INTERVAL: 88 MS
QT INTERVAL: 368 MS
QTC INTERVAL: 427 MS
RBC # BLD AUTO: 4.49 MILLION/UL (ref 3.81–5.12)
RBC #/AREA URNS AUTO: ABNORMAL /HPF
SODIUM SERPL-SCNC: 141 MMOL/L (ref 136–145)
SP GR UR STRIP.AUTO: >=1.03 (ref 1–1.03)
T WAVE AXIS: 11 DEGREES
UROBILINOGEN UR QL STRIP.AUTO: 0.2 E.U./DL
VENTRICULAR RATE: 81 BPM
WBC # BLD AUTO: 4.81 THOUSAND/UL (ref 4.31–10.16)
WBC #/AREA URNS AUTO: ABNORMAL /HPF

## 2021-01-18 PROCEDURE — 96375 TX/PRO/DX INJ NEW DRUG ADDON: CPT

## 2021-01-18 PROCEDURE — 85025 COMPLETE CBC W/AUTO DIFF WBC: CPT | Performed by: EMERGENCY MEDICINE

## 2021-01-18 PROCEDURE — 85379 FIBRIN DEGRADATION QUANT: CPT | Performed by: EMERGENCY MEDICINE

## 2021-01-18 PROCEDURE — 93005 ELECTROCARDIOGRAM TRACING: CPT

## 2021-01-18 PROCEDURE — 99284 EMERGENCY DEPT VISIT MOD MDM: CPT

## 2021-01-18 PROCEDURE — G1004 CDSM NDSC: HCPCS

## 2021-01-18 PROCEDURE — 36415 COLL VENOUS BLD VENIPUNCTURE: CPT | Performed by: EMERGENCY MEDICINE

## 2021-01-18 PROCEDURE — 80053 COMPREHEN METABOLIC PANEL: CPT | Performed by: EMERGENCY MEDICINE

## 2021-01-18 PROCEDURE — 96361 HYDRATE IV INFUSION ADD-ON: CPT

## 2021-01-18 PROCEDURE — 74177 CT ABD & PELVIS W/CONTRAST: CPT

## 2021-01-18 PROCEDURE — 96374 THER/PROPH/DIAG INJ IV PUSH: CPT

## 2021-01-18 PROCEDURE — 83690 ASSAY OF LIPASE: CPT | Performed by: EMERGENCY MEDICINE

## 2021-01-18 PROCEDURE — 81025 URINE PREGNANCY TEST: CPT | Performed by: EMERGENCY MEDICINE

## 2021-01-18 PROCEDURE — 99285 EMERGENCY DEPT VISIT HI MDM: CPT | Performed by: EMERGENCY MEDICINE

## 2021-01-18 PROCEDURE — 93010 ELECTROCARDIOGRAM REPORT: CPT | Performed by: INTERNAL MEDICINE

## 2021-01-18 PROCEDURE — 81001 URINALYSIS AUTO W/SCOPE: CPT

## 2021-01-18 RX ORDER — DICYCLOMINE HCL 20 MG
20 TABLET ORAL ONCE
Status: COMPLETED | OUTPATIENT
Start: 2021-01-18 | End: 2021-01-18

## 2021-01-18 RX ORDER — ONDANSETRON 2 MG/ML
4 INJECTION INTRAMUSCULAR; INTRAVENOUS ONCE
Status: COMPLETED | OUTPATIENT
Start: 2021-01-18 | End: 2021-01-18

## 2021-01-18 RX ORDER — HYDROMORPHONE HCL/PF 1 MG/ML
0.5 SYRINGE (ML) INJECTION ONCE
Status: DISCONTINUED | OUTPATIENT
Start: 2021-01-18 | End: 2021-01-18

## 2021-01-18 RX ORDER — DICYCLOMINE HCL 20 MG
20 TABLET ORAL 2 TIMES DAILY PRN
Qty: 20 TABLET | Refills: 0 | Status: SHIPPED | OUTPATIENT
Start: 2021-01-18

## 2021-01-18 RX ORDER — KETOROLAC TROMETHAMINE 30 MG/ML
15 INJECTION, SOLUTION INTRAMUSCULAR; INTRAVENOUS ONCE
Status: COMPLETED | OUTPATIENT
Start: 2021-01-18 | End: 2021-01-18

## 2021-01-18 RX ORDER — POTASSIUM CHLORIDE 20 MEQ/1
60 TABLET, EXTENDED RELEASE ORAL ONCE
Status: COMPLETED | OUTPATIENT
Start: 2021-01-18 | End: 2021-01-18

## 2021-01-18 RX ORDER — ONDANSETRON 4 MG/1
4 TABLET, ORALLY DISINTEGRATING ORAL EVERY 6 HOURS PRN
Qty: 10 TABLET | Refills: 0 | Status: SHIPPED | OUTPATIENT
Start: 2021-01-18 | End: 2022-03-08 | Stop reason: SDUPTHER

## 2021-01-18 RX ORDER — OLANZAPINE 5 MG/1
5 TABLET ORAL ONCE
Status: COMPLETED | OUTPATIENT
Start: 2021-01-18 | End: 2021-01-18

## 2021-01-18 RX ADMIN — POTASSIUM CHLORIDE 60 MEQ: 1500 TABLET, EXTENDED RELEASE ORAL at 18:38

## 2021-01-18 RX ADMIN — SODIUM CHLORIDE 500 ML: 0.9 INJECTION, SOLUTION INTRAVENOUS at 17:04

## 2021-01-18 RX ADMIN — OLANZAPINE 5 MG: 5 TABLET, FILM COATED ORAL at 17:37

## 2021-01-18 RX ADMIN — ONDANSETRON 4 MG: 2 INJECTION INTRAMUSCULAR; INTRAVENOUS at 17:03

## 2021-01-18 RX ADMIN — DICYCLOMINE HYDROCHLORIDE 20 MG: 20 TABLET ORAL at 19:28

## 2021-01-18 RX ADMIN — KETOROLAC TROMETHAMINE 15 MG: 30 INJECTION, SOLUTION INTRAMUSCULAR at 17:03

## 2021-01-18 RX ADMIN — IOHEXOL 100 ML: 350 INJECTION, SOLUTION INTRAVENOUS at 18:36

## 2021-01-18 NOTE — ED PROVIDER NOTES
History  Chief Complaint   Patient presents with    Medical Problem     patient c/o BL flank pain, generalized abdominal pain, HA  + COVID  started 4 days ago  c/o decreased amount of urine and diarrhea  HPI  51 yo female with PMH asthma presents to the ED with concern for abdominal pain, back pain, nausea, vomiting diarrhea, headache and neck pain  Pt states that sxs have been ongoing for approximately 4 days  She was evaluated in the ED at [facility] on 1/15 and had a positive COVID test  Pt reports sxs are worsening today and she called her PCP who advised her to come to the ED  She describes severe abdominal pain "all over" but worse in the right upper quadrant and associated "burning" pain in her bilateral flanks/lower back  Pain is worse with breathing, but pt denies SOB or cough at this time  States she has had multiple episodes of diarrhea and vomiting today and is currently very nauseated  No blood in emesis or stool  Also reporting burning pain in her posterior head and radiating down her neck  Denies visual changes, numbness, weakness  Pt has not measured her temperature at home  Hx unknown abdominal surgery  In the ED she is pacing the room, moaning, and unable to get comfortable  States no relief with tylenol this morning  Prior to Admission Medications   Prescriptions Last Dose Informant Patient Reported? Taking?    albuterol (PROVENTIL HFA,VENTOLIN HFA) 90 mcg/act inhaler   Yes Yes   Sig: Inhale 2 puffs every 6 (six) hours as needed for wheezing   naproxen (NAPROSYN) 500 mg tablet Not Taking at Unknown time  No No   Sig: Take 1 tablet (500 mg total) by mouth every 12 (twelve) hours as needed for mild pain or moderate pain   Patient not taking: Reported on 1/18/2021   ondansetron (ZOFRAN-ODT) 4 mg disintegrating tablet   No Yes   Sig: Take 1 tablet (4 mg total) by mouth every 8 (eight) hours as needed for nausea or vomiting      Facility-Administered Medications: None       Past Medical History: Diagnosis Date    Asthma        Past Surgical History:   Procedure Laterality Date    ABDOMINAL SURGERY         No family history on file  I have reviewed and agree with the history as documented      E-Cigarette/Vaping     E-Cigarette/Vaping Substances     Social History     Tobacco Use    Smoking status: Never Smoker    Smokeless tobacco: Never Used   Substance Use Topics    Alcohol use: No    Drug use: No        Review of Systems    Physical Exam  ED Triage Vitals [01/18/21 1620]   Temperature Pulse Respirations Blood Pressure SpO2   99 3 °F (37 4 °C) (!) 124 20 (!) 204/101 99 %      Temp Source Heart Rate Source Patient Position - Orthostatic VS BP Location FiO2 (%)   Temporal Monitor Sitting Right arm --      Pain Score       Worst Possible Pain             Orthostatic Vital Signs  Vitals:    01/18/21 1620 01/18/21 1837   BP: (!) 204/101 106/72   Pulse: (!) 124 87   Patient Position - Orthostatic VS: Sitting        Physical Exam    ED Medications  Medications   ondansetron (ZOFRAN) injection 4 mg (4 mg Intravenous Given 1/18/21 1703)   ketorolac (TORADOL) injection 15 mg (15 mg Intravenous Given 1/18/21 1703)   sodium chloride 0 9 % bolus 500 mL (0 mL Intravenous Stopped 1/18/21 1836)   OLANZapine (ZyPREXA) tablet 5 mg (5 mg Oral Given 1/18/21 1737)   potassium chloride (K-DUR,KLOR-CON) CR tablet 60 mEq (60 mEq Oral Given 1/18/21 1838)   iohexol (OMNIPAQUE) 350 MG/ML injection (SINGLE-DOSE) 100 mL (100 mL Intravenous Given 1/18/21 1836)   dicyclomine (BENTYL) tablet 20 mg (20 mg Oral Given 1/18/21 1928)       Diagnostic Studies  Results Reviewed     Procedure Component Value Units Date/Time    Urine Microscopic [[id number]]  (Abnormal) Collected: 01/18/21 1656    Lab Status: Final result Specimen: Urine, Clean Catch Updated: 01/18/21 1834     RBC, UA None Seen /hpf      WBC, UA 1-2 /hpf      Epithelial Cells None Seen /hpf      Bacteria, UA Occasional /hpf      MUCUS THREADS Occasional    Comprehensive metabolic panel [[id number]]  (Abnormal) Collected: 01/18/21 1701    Lab Status: Final result Specimen: Blood from Arm, Left Updated: 01/18/21 1813     Sodium 141 mmol/L      Potassium 2 9 mmol/L      Chloride 104 mmol/L      CO2 27 mmol/L      ANION GAP 10 mmol/L      BUN 12 mg/dL      Creatinine 0 95 mg/dL      Glucose 110 mg/dL      Calcium 8 7 mg/dL      AST 17 U/L      ALT 26 U/L      Alkaline Phosphatase 84 U/L      Total Protein 8 0 g/dL      Albumin 3 8 g/dL      Total Bilirubin 0 23 mg/dL      eGFR 71 ml/min/1 73sq m     Narrative:      [place] guidelines for Chronic Kidney Disease (CKD):     Stage 1 with normal or high GFR (GFR > 90 mL/min/1 73 square meters)    Stage 2 Mild CKD (GFR = 60-89 mL/min/1 73 square meters)    Stage 3A Moderate CKD (GFR = 45-59 mL/min/1 73 square meters)    Stage 3B Moderate CKD (GFR = 30-44 mL/min/1 73 square meters)    Stage 4 Severe CKD (GFR = 15-29 mL/min/1 73 square meters)    Stage 5 End Stage CKD (GFR <15 mL/min/1 73 square meters)  Note: GFR calculation is accurate only with a steady state creatinine    Lipase [[id number]]  (Normal) Collected: 01/18/21 1701    Lab Status: Final result Specimen: Blood from Arm, Left Updated: 01/18/21 1813     Lipase 176 u/L     D-dimer, quantitative [[id number]]  (Normal) Collected: 01/18/21 1701    Lab Status: Final result Specimen: Blood from Arm, Left Updated: 01/18/21 1750     D-Dimer, Quant 0 42 ug/ml FEU     CBC and differential [[id number]] Collected: 01/18/21 1701    Lab Status: Final result Specimen: Blood from Arm, Left Updated: 01/18/21 1713     WBC 4 81 Thousand/uL      RBC 4 49 Million/uL      Hemoglobin 14 1 g/dL      Hematocrit 42 7 %      MCV 95 fL      MCH 31 4 pg      MCHC 33 0 g/dL      RDW 12 1 %      MPV 10 3 fL      Platelets 521 Thousands/uL      nRBC 0 /100 WBCs      Neutrophils Relative 52 %      Immat GRANS % 0 %      Lymphocytes Relative 37 %      Monocytes Relative 11 % Eosinophils Relative 0 %      Basophils Relative 0 %      Neutrophils Absolute 2 51 Thousands/µL      Immature Grans Absolute 0 01 Thousand/uL      Lymphocytes Absolute 1 76 Thousands/µL      Monocytes Absolute 0 51 Thousand/µL      Eosinophils Absolute 0 01 Thousand/µL      Basophils Absolute 0 01 Thousands/µL     POCT urinalysis dipstick [[id number]]  (Abnormal) Resulted: 01/18/21 1709    Lab Status: Final result Updated: 01/18/21 1709    POCT pregnancy, urine [[id number]]  (Normal) Resulted: 01/18/21 1709    Lab Status: Final result Updated: 01/18/21 1709     EXT PREG TEST UR (Ref: Negative) negative     Control valid    Urine Macroscopic, POC [[id number]]  (Abnormal) Collected: 01/18/21 1656    Lab Status: Final result Specimen: Urine Updated: 01/18/21 1657     Color, UA Yellow     Clarity, UA Clear     pH, UA 6 0     Leukocytes, UA Negative     Nitrite, UA Negative     Protein,  (2+) mg/dl      Glucose, UA Negative mg/dl      Ketones, UA Negative mg/dl      Urobilinogen, UA 0 2 E U /dl      Bilirubin, UA Negative     Blood, UA Small     Specific [place], UA >=1 030    Narrative:      CLINITEK RESULT                 CT abdomen pelvis w contrast   Final Result by Jack Berg MD (01/18 1902)      No acute CT findings  Workstation performed: [id number]               Procedures  Procedures      ED Course  ED Course as of Jan 18 1951   Mon Jan 18, 2021   1714 EKG NSR rate 81, normal axis, nonspecific ST elevation in V1 and depression in III, aVF  Nonspecific T wave inversion in V1  Qtc 427  No significant change from prior       1752 Will CT abdomen/pelvis, no PE study or chest imaging indicated   D-Dimer, Quant: 0 42   1813 Will replete   Potassium(!): 2 9 1848 Pt reports some abdominal pain still present but significantly improved and denies wanting more pain medications at this time  1911 No acute findings  Pt has tolerated drinking water in the ED   Will discharge with prescriptions for zofran and bentyl, strict return precautions and PCP follow up  CT abdomen pelvis w contrast                             SBIRT 20yo+      Most Recent Value   SBIRT (22 yo +)   In order to provide better care to our patients, we are screening all of our patients for alcohol and drug use  Would it be okay to ask you these screening questions? No Filed at: 01/18/2021 1740                MDM  51 yo female with PMH asthma, recent positive COVID test, presenting with concern for abdominal pain, back pain, nausea, vomiting diarrhea, headache and neck pain  Will obtain CBC, CMP, Lipase, EKG, D-Dimer, CXR, CT abd/pelvis to evaluate for leukocytosis, anemia, electrolyte abnormality, renal dysfunction, hepatic dysfunction, pancreatitis, PE, acute intraabdominal abnormality  If no acute findings sxs likely secondary to COVID  Pt is not hypoxic or in respiratory distress in the ED  Will treat with zofran, toradol, zypreza, IV fluids, and reassess     Disposition  Final diagnoses:   Nausea and vomiting   Abdominal pain   Back pain   Headache   COVID-19   Diarrhea   Hypokalemia     Time reflects when diagnosis was documented in both MDM as applicable and the Disposition within this note     Time User Action Codes Description Comment    1/18/2021  7:12 PM Henrene Naye Add [R11 2] Nausea and vomiting     1/18/2021  7:13 PM Henrene Naye Add [R10 9] Abdominal pain     1/18/2021  7:13 PM Henrene Naye Add [M54 9] Back pain     1/18/2021  7:13 PM Lorrin Romance [R51 9] Headache     1/18/2021  7:13 PM Lorrin Romance [U07 1] COVID-19     1/18/2021  7:13 PM Henrene Naye Modify [R11 2] Nausea and vomiting     1/18/2021  7:13 PM Henrene Naye Modify [U07 1] COVID-19     1/18/2021  7:13 PM Henrene Naye Add [R19 7] Diarrhea     1/18/2021  7:47 PM Henrene Naye Add [E87 6] Hypokalemia       ED Disposition     ED Disposition Condition Date/Time Comment    Discharge Stable Mon Jan 18, 2021  7:12 PM [address]  [facility] discharge to home/self care  Follow-up Information     Follow up With Specialties Details Why Contact Info    Olivia Marroquin MD Family Medicine Schedule an appointment as soon as possible for a visit   [address] [address] [phone]            Discharge Medication List as of 1/18/2021  7:16 PM      START taking these medications    Details   dicyclomine (BENTYL) 20 mg tablet Take 1 tablet (20 mg total) by mouth 2 (two) times a day as needed (abdominal pain), Starting Mon 1/18/2021, Normal      !! ondansetron (ZOFRAN-ODT) 4 mg disintegrating tablet Take 1 tablet (4 mg total) by mouth every 6 (six) hours as needed for nausea or vomiting, Starting Mon 1/18/2021, Normal       !! - Potential duplicate medications found  Please discuss with provider  CONTINUE these medications which have NOT CHANGED    Details   albuterol (PROVENTIL HFA,VENTOLIN HFA) 90 mcg/act inhaler Inhale 2 puffs every 6 (six) hours as needed for wheezing, Historical Med      !! ondansetron (ZOFRAN-ODT) 4 mg disintegrating tablet Take 1 tablet (4 mg total) by mouth every 8 (eight) hours as needed for nausea or vomiting, Starting Fri 2/28/2020, Print      naproxen (NAPROSYN) 500 mg tablet Take 1 tablet (500 mg total) by mouth every 12 (twelve) hours as needed for mild pain or moderate pain, Starting Fri 2/28/2020, Print       !! - Potential duplicate medications found  Please discuss with provider  No discharge procedures on file  PDMP Review     None           ED Provider  Attending physically available and evaluated Robyn Terell NGUYEN managed the patient along with the ED Attending      Electronically Signed by         Chava Mueller MD  01/18/21 5560

## 2021-01-19 NOTE — ED ATTENDING ATTESTATION
1/18/2021  IAbhishek MD, saw and evaluated the patient  I have discussed the patient with the resident/non-physician practitioner and agree with the resident's/non-physician practitioner's findings, Plan of Care, and MDM as documented in the resident's/non-physician practitioner's note, except where noted  All available labs and Radiology studies were reviewed  I was present for key portions of any procedure(s) performed by the resident/non-physician practitioner and I was immediately available to provide assistance  At this point I agree with the current assessment done in the Emergency Department  I have conducted an independent evaluation of this patient a history and physical is as follows:    65-year-old female with Baudilio presents for evaluation of multiple complaints  Patient reports 4 days of burning denies abdominal pain resolved or bilateral flanks  Associated with diarrhea, headache, decreased urine output  Denies shortness of breath  Ten systems reviewed otherwise negative exam no distress, lungs normal cardiac normal, abdomen diffusely tender palpation without rebound or guarding, no CVA tenderness  Medical decision making;-will do labs, abdominal, CT abdomen and pelvis read acute pathology, treat symptoms reassess      ED Course         Critical Care Time  Procedures

## 2021-01-19 NOTE — DISCHARGE INSTRUCTIONS
Return to the emergency department for severe worsening of pain, persistent vomiting with inability to drink, increased difficulty breathing, any other new or concerning symptoms

## 2021-01-27 NOTE — QUICK NOTE
Review of Systems   Constitutional: Negative for chills and fever  HENT: Negative for congestion, rhinorrhea and sore throat  Eyes: Negative for visual disturbance  Respiratory: Negative for cough, chest tightness and shortness of breath  Cardiovascular: Negative for chest pain  Gastrointestinal: Positive for abdominal pain, diarrhea, nausea and vomiting  Negative for blood in stool  Genitourinary: Negative for dysuria and hematuria  Musculoskeletal: Positive for arthralgias, back pain, myalgias and neck pain  Negative for neck stiffness  Skin: Negative for rash  Neurological: Positive for headaches  Negative for dizziness, syncope, weakness, light-headedness and numbness  All other systems reviewed and are negative  Physical Exam  Vitals signs and nursing note reviewed  Constitutional:       General: She is in acute distress (pacing the room, appears uncomfortable)  Appearance: She is well-developed  She is not diaphoretic  HENT:      Head: Normocephalic and atraumatic  Right Ear: External ear normal       Left Ear: External ear normal       Nose: Nose normal       Mouth/Throat:      Mouth: Mucous membranes are moist    Eyes:      Conjunctiva/sclera: Conjunctivae normal       Pupils: Pupils are equal, round, and reactive to light  Neck:      Musculoskeletal: Normal range of motion and neck supple  No neck rigidity, spinous process tenderness or muscular tenderness  Cardiovascular:      Rate and Rhythm: Normal rate and regular rhythm  Heart sounds: Normal heart sounds  No murmur  No friction rub  No gallop  Pulmonary:      Effort: Pulmonary effort is normal  No respiratory distress  Breath sounds: Normal breath sounds  No wheezing, rhonchi or rales  Abdominal:      General: Bowel sounds are normal  There is no distension  Palpations: Abdomen is soft  There is no mass  Tenderness: There is generalized abdominal tenderness   There is no guarding or rebound  Musculoskeletal: Normal range of motion  Lymphadenopathy:      Cervical: No cervical adenopathy  Skin:     General: Skin is warm and dry  Neurological:      Mental Status: She is alert and oriented to person, place, and time  Cranial Nerves: Cranial nerves are intact  No cranial nerve deficit  Sensory: Sensation is intact  No sensory deficit  Motor: Motor function is intact  No weakness  Gait: Gait is intact

## 2021-05-14 ENCOUNTER — HOSPITAL ENCOUNTER (EMERGENCY)
Facility: HOSPITAL | Age: 51
Discharge: HOME | End: 2021-05-14
Attending: EMERGENCY MEDICINE | Admitting: EMERGENCY MEDICINE
Payer: COMMERCIAL

## 2021-05-14 ENCOUNTER — APPOINTMENT (EMERGENCY)
Dept: RADIOLOGY | Facility: HOSPITAL | Age: 51
End: 2021-05-14
Payer: COMMERCIAL

## 2021-05-14 VITALS
OXYGEN SATURATION: 99 % | TEMPERATURE: 98.2 F | SYSTOLIC BLOOD PRESSURE: 139 MMHG | RESPIRATION RATE: 20 BRPM | WEIGHT: 149.25 LBS | DIASTOLIC BLOOD PRESSURE: 70 MMHG | HEART RATE: 85 BPM

## 2021-05-14 DIAGNOSIS — M62.838 TRAPEZIUS MUSCLE SPASM: Primary | ICD-10-CM

## 2021-05-14 PROCEDURE — 96372 THER/PROPH/DIAG INJ SC/IM: CPT

## 2021-05-14 PROCEDURE — 99284 EMERGENCY DEPT VISIT MOD MDM: CPT | Performed by: PHYSICIAN ASSISTANT

## 2021-05-14 PROCEDURE — 99283 EMERGENCY DEPT VISIT LOW MDM: CPT

## 2021-05-14 RX ORDER — NAPROXEN 500 MG/1
500 TABLET ORAL 2 TIMES DAILY WITH MEALS
Qty: 30 TABLET | Refills: 0 | Status: SHIPPED | OUTPATIENT
Start: 2021-05-14 | End: 2022-02-21

## 2021-05-14 RX ORDER — LIDOCAINE 50 MG/G
1 PATCH TOPICAL ONCE
Status: DISCONTINUED | OUTPATIENT
Start: 2021-05-14 | End: 2021-05-14 | Stop reason: HOSPADM

## 2021-05-14 RX ORDER — KETOROLAC TROMETHAMINE 30 MG/ML
15 INJECTION, SOLUTION INTRAMUSCULAR; INTRAVENOUS ONCE
Status: COMPLETED | OUTPATIENT
Start: 2021-05-14 | End: 2021-05-14

## 2021-05-14 RX ORDER — LIDOCAINE 50 MG/G
1 PATCH TOPICAL DAILY
Qty: 6 PATCH | Refills: 0 | Status: SHIPPED | OUTPATIENT
Start: 2021-05-14

## 2021-05-14 RX ORDER — METHOCARBAMOL 500 MG/1
500 TABLET, FILM COATED ORAL 2 TIMES DAILY
Qty: 20 TABLET | Refills: 0 | Status: SHIPPED | OUTPATIENT
Start: 2021-05-14

## 2021-05-14 RX ADMIN — KETOROLAC TROMETHAMINE 15 MG: 30 INJECTION, SOLUTION INTRAMUSCULAR; INTRAVENOUS at 14:35

## 2021-05-14 RX ADMIN — LIDOCAINE 1 PATCH: 50 PATCH TOPICAL at 14:34

## 2021-05-14 NOTE — ED PROVIDER NOTES
History  Chief Complaint   Patient presents with    Shoulder Pain     Pt reports L sided shoulder/neck/leg pain starting yesterday  Denies numbness/tingling     [de-identified] Yo female presents to ED for evaluation of left-sided body pain described as stiffness that started yesterday while she was lying down going to go to sleep and worsened this morning when she woke up  Patient states that pain is most notable along the left trapezius muscle tract and extends from the left occiput to the left scapular region into the left shoulder  Pain here is worsened with shoulder flexion and neck extension  Denies any weakness, numbness, and paresthesias to upper/ lower extremity  Improvement in left leg pain since this AM  Pain here was felt "throughout the whole leg from thigh to ankle"  Patient reports that she was unable to get a comfortable position last night due to the pain, and woke up today to [address] [place] [address]  Normal gait today  Denies any medications or interventions for symptoms  No recent injury or trauma  History provided by:  Patient   used: No        Prior to Admission Medications   Prescriptions Last Dose Informant Patient Reported? Taking?    albuterol (PROVENTIL HFA,VENTOLIN HFA) 90 mcg/act inhaler   Yes No   Sig: Inhale 2 puffs every 6 (six) hours as needed for wheezing   dicyclomine (BENTYL) 20 mg tablet   No No   Sig: Take 1 tablet (20 mg total) by mouth 2 (two) times a day as needed (abdominal pain)   naproxen (NAPROSYN) 500 mg tablet   No No   Sig: Take 1 tablet (500 mg total) by mouth every 12 (twelve) hours as needed for mild pain or moderate pain   Patient not taking: Reported on 1/18/2021   ondansetron (ZOFRAN-ODT) 4 mg disintegrating tablet   No No   Sig: Take 1 tablet (4 mg total) by mouth every 8 (eight) hours as needed for nausea or vomiting   ondansetron (ZOFRAN-ODT) 4 mg disintegrating tablet   No No   Sig: Take 1 tablet (4 mg total) by mouth every 6 (six) hours as needed for nausea or vomiting      Facility-Administered Medications: None       Past Medical History:   Diagnosis Date    Asthma        Past Surgical History:   Procedure Laterality Date    ABDOMINAL SURGERY         History reviewed  No pertinent family history  I have reviewed and agree with the history as documented  E-Cigarette/Vaping    E-Cigarette Use Never User      E-Cigarette/Vaping Substances    Nicotine No     THC No     CBD No     Flavoring No     Other No     Unknown No      Social History     Tobacco Use    Smoking status: Never Smoker    Smokeless tobacco: Never Used   Substance Use Topics    Alcohol use: No    Drug use: No       Review of Systems   Constitutional: Negative for chills, diaphoresis, fatigue and fever  HENT: Negative for congestion, rhinorrhea and sore throat  Eyes: Negative for photophobia and visual disturbance  Respiratory: Negative for cough and shortness of breath  Cardiovascular: Negative for chest pain and palpitations  Gastrointestinal: Negative for abdominal pain, constipation, diarrhea, nausea and vomiting  Genitourinary: Negative for difficulty urinating  Musculoskeletal: Positive for arthralgias and neck stiffness  Negative for back pain, gait problem, joint swelling, myalgias and neck pain  Skin: Positive for pallor  Negative for color change, rash and wound  Neurological: Negative for dizziness, weakness, light-headedness, numbness and headaches  Hematological: Negative for adenopathy  Does not bruise/bleed easily  Psychiatric/Behavioral: Negative for behavioral problems  Physical Exam  Physical Exam  Vitals signs and nursing note reviewed  Constitutional:       General: She is not in acute distress  Appearance: Normal appearance  She is well-developed and normal weight  She is not ill-appearing, toxic-appearing or diaphoretic  HENT:      Head: Normocephalic and atraumatic        Right Ear: Tympanic membrane, ear canal and external ear normal  There is no impacted cerumen  Left Ear: Tympanic membrane, ear canal and external ear normal  There is no impacted cerumen  Nose: Nose normal  No congestion or rhinorrhea  Mouth/Throat:      Mouth: Mucous membranes are moist       Pharynx: Oropharynx is clear  No oropharyngeal exudate or posterior oropharyngeal erythema  Eyes:      General: No scleral icterus  Right eye: No discharge  Left eye: No discharge  Extraocular Movements: Extraocular movements intact  Conjunctiva/sclera: Conjunctivae normal       Pupils: Pupils are equal, round, and reactive to light  Neck:      Musculoskeletal: Normal range of motion  Normal range of motion  Pain with movement and muscular tenderness present  No edema, erythema, neck rigidity, crepitus or injury  Cardiovascular:      Rate and Rhythm: Normal rate and regular rhythm  Pulses: Normal pulses  Radial pulses are 2+ on the right side and 2+ on the left side  Heart sounds: Normal heart sounds  No murmur  Pulmonary:      Effort: Pulmonary effort is normal  No respiratory distress  Breath sounds: Normal breath sounds  No wheezing  Abdominal:      General: Bowel sounds are normal  There is no distension  Palpations: Abdomen is soft  There is no mass  Tenderness: There is no abdominal tenderness  There is no guarding or rebound  Musculoskeletal: Normal range of motion  General: Tenderness present  No swelling, deformity or signs of injury  Right lower leg: No edema  Left lower leg: No edema  Comments: No signs of injury or trauma  No lower left leg erythema, edema, ecchymosis, or swelling  Distal PT/DP pulses intact  Lymphadenopathy:      Cervical: No cervical adenopathy  Right cervical: No superficial or deep cervical adenopathy  Left cervical: No superficial or deep cervical adenopathy  Skin:     General: Skin is warm and dry  Capillary Refill: Capillary refill takes less than 2 seconds  Coloration: Skin is not jaundiced or pale  Findings: No bruising or erythema  Neurological:      General: No focal deficit present  Mental Status: She is alert and oriented to person, place, and time  Sensory: No sensory deficit  Motor: No weakness  Gait: Gait normal    Psychiatric:         Mood and Affect: Mood normal          Behavior: Behavior normal          Vital Signs  ED Triage Vitals [05/14/21 1414]   Temperature Pulse Respirations Blood Pressure SpO2   98 2 °F (36 8 °C) 85 20 139/70 99 %      Temp Source Heart Rate Source Patient Position - Orthostatic VS BP Location FiO2 (%)   Oral -- Sitting Right arm --      Pain Score       8           Vitals:    05/14/21 1414   BP: 139/70   Pulse: 85   Patient Position - Orthostatic VS: Sitting         Visual Acuity      ED Medications  Medications   lidocaine (LIDODERM) 5 % patch 1 patch (1 patch Topical Medication Applied 5/14/21 1434)   ketorolac (TORADOL) injection 15 mg (15 mg Intramuscular Given 5/14/21 1435)       Diagnostic Studies  Results Reviewed     None                 No orders to display              Procedures  Procedures         ED Course                                           MDM  Number of Diagnoses or Management Options  Trapezius muscle spasm: new and requires workup  Diagnosis management comments: 65-year-old female presents to the ED for evaluation of left-sided body pain most notable along left trapezius muscle tract  Patient woke up to onset of symptoms she describes as "stiffness"  Muscle tenderness along left trapezius muscle  Full range of motion  No significant erythema, edema, or swelling noted  No spinal tenderness  No neurological deficits  Patient provided Toradol and Lidoderm an ED  Patient discharged with symptomatic support to include muscle relaxer, Lidoderm, and NSAID  Advised to follow-up with PCP if symptoms continue  Amount and/or Complexity of Data Reviewed  Review and summarize past medical records: yes  Discuss the patient with other providers: yes    Risk of Complications, Morbidity, and/or Mortality  Presenting problems: moderate  Diagnostic procedures: moderate  Management options: moderate    Patient Progress  Patient progress: stable      Disposition  Final diagnoses:   Trapezius muscle spasm     Time reflects when diagnosis was documented in both MDM as applicable and the Disposition within this note     Time User Action Codes Description Comment    5/14/2021  2:54 PM Rodríguez Maurer Carley [U55 [id number]] Trapezius muscle spasm       ED Disposition     ED Disposition Condition Date/Time Comment    Discharge Stable Fri May 14, 2021  2:54 PM Josh Florencio discharge to home/self care  Follow-up Information     Follow up With Specialties Details Why Contact Info    Saul Aquino MD Family Medicine   [place] [id number] [phone]            Patient's Medications   Discharge Prescriptions    LIDOCAINE (LIDODERM) 5 %    Apply 1 patch topically daily Remove & Discard patch within 12 hours or as directed by MD       Start Date: 5/14/2021 End Date: --       Order Dose: 1 patch       Quantity: 6 patch    Refills: 0    METHOCARBAMOL (ROBAXIN) 500 MG TABLET    Take 1 tablet (500 mg total) by mouth 2 (two) times a day       Start Date: 5/14/2021 End Date: --       Order Dose: 500 mg       Quantity: 20 tablet    Refills: 0    NAPROXEN (NAPROSYN) 500 MG TABLET    Take 1 tablet (500 mg total) by mouth 2 (two) times a day with meals       Start Date: 5/14/2021 End Date: --       Order Dose: 500 mg       Quantity: 30 tablet    Refills: 0     No discharge procedures on file      PDMP Review     None          ED Provider  Electronically Signed by           Marcy Morse PA-C  05/14/21 2068

## 2021-05-25 ENCOUNTER — APPOINTMENT (EMERGENCY)
Dept: CT IMAGING | Facility: HOSPITAL | Age: 51
End: 2021-05-25
Payer: COMMERCIAL

## 2021-05-25 ENCOUNTER — HOSPITAL ENCOUNTER (EMERGENCY)
Facility: HOSPITAL | Age: 51
Discharge: HOME | End: 2021-05-25
Payer: COMMERCIAL

## 2021-05-25 VITALS
OXYGEN SATURATION: 100 % | HEART RATE: 62 BPM | DIASTOLIC BLOOD PRESSURE: 93 MMHG | RESPIRATION RATE: 18 BRPM | SYSTOLIC BLOOD PRESSURE: 145 MMHG | TEMPERATURE: 97.2 F | WEIGHT: 149.19 LBS

## 2021-05-25 DIAGNOSIS — K62.5 RECTAL BLEEDING: Primary | ICD-10-CM

## 2021-05-25 LAB
ABO GROUP BLD: NORMAL
ABO GROUP BLD: NORMAL
ALBUMIN SERPL BCP-MCNC: 3.9 G/DL (ref 3–5.2)
ALP SERPL-CCNC: 70 U/L (ref 43–122)
ALT SERPL W P-5'-P-CCNC: 11 U/L
ANION GAP SERPL CALCULATED.3IONS-SCNC: 5 MMOL/L (ref 5–14)
APTT PPP: 28 SECONDS (ref 23–37)
AST SERPL W P-5'-P-CCNC: 22 U/L (ref 14–36)
BACTERIA UR QL AUTO: ABNORMAL /HPF
BASOPHILS # BLD AUTO: 0 THOUSANDS/ΜL (ref 0–0.1)
BASOPHILS NFR BLD AUTO: 1 % (ref 0–1)
BILIRUB SERPL-MCNC: 0.84 MG/DL
BILIRUB UR QL STRIP: NEGATIVE
BLD GP AB SCN SERPL QL: NEGATIVE
BUN SERPL-MCNC: 16 MG/DL (ref 5–25)
CALCIUM SERPL-MCNC: 9.2 MG/DL (ref 8.4–10.2)
CHLORIDE SERPL-SCNC: 107 MMOL/L (ref 97–108)
CLARITY UR: CLEAR
CO2 SERPL-SCNC: 27 MMOL/L (ref 22–30)
COLOR UR: YELLOW
CREAT SERPL-MCNC: 0.8 MG/DL (ref 0.6–1.2)
EOSINOPHIL # BLD AUTO: 0.3 THOUSAND/ΜL (ref 0–0.4)
EOSINOPHIL NFR BLD AUTO: 5 % (ref 0–6)
ERYTHROCYTE [DISTWIDTH] IN BLOOD BY AUTOMATED COUNT: 12.8 %
EXT PREG TEST URINE: NEGATIVE
EXT. CONTROL ED NAV: NORMAL
GFR SERPL CREATININE-BSD FRML MDRD: 87 ML/MIN/1.73SQ M
GLUCOSE SERPL-MCNC: 82 MG/DL (ref 70–99)
GLUCOSE UR STRIP-MCNC: NEGATIVE MG/DL
HCT VFR BLD AUTO: 40.1 % (ref 36–46)
HGB BLD-MCNC: 13.5 G/DL (ref 12–16)
HGB UR QL STRIP.AUTO: 50
INR PPP: 0.98 (ref 0.84–1.19)
KETONES UR STRIP-MCNC: NEGATIVE MG/DL
LEUKOCYTE ESTERASE UR QL STRIP: NEGATIVE
LYMPHOCYTES # BLD AUTO: 2 THOUSANDS/ΜL (ref 0.5–4)
LYMPHOCYTES NFR BLD AUTO: 30 % (ref 25–45)
MCH RBC QN AUTO: 31.6 PG (ref 26–34)
MCHC RBC AUTO-ENTMCNC: 33.6 G/DL (ref 31–36)
MCV RBC AUTO: 94 FL (ref 80–100)
MONOCYTES # BLD AUTO: 0.6 THOUSAND/ΜL (ref 0.2–0.9)
MONOCYTES NFR BLD AUTO: 9 % (ref 1–10)
MUCOUS THREADS UR QL AUTO: ABNORMAL
NEUTROPHILS # BLD AUTO: 3.7 THOUSANDS/ΜL (ref 1.8–7.8)
NEUTS SEG NFR BLD AUTO: 56 % (ref 45–65)
NITRITE UR QL STRIP: NEGATIVE
NON-SQ EPI CELLS URNS QL MICRO: ABNORMAL /HPF
PH UR STRIP.AUTO: 6 [PH]
PLATELET # BLD AUTO: 281 THOUSANDS/UL (ref 150–450)
PMV BLD AUTO: 9.3 FL (ref 8.9–12.7)
POTASSIUM SERPL-SCNC: 3.8 MMOL/L (ref 3.6–5)
PROT SERPL-MCNC: 7.3 G/DL (ref 5.9–8.4)
PROT UR STRIP-MCNC: NEGATIVE MG/DL
PROTHROMBIN TIME: 13.1 SECONDS (ref 11.6–14.5)
RBC # BLD AUTO: 4.26 MILLION/UL (ref 4–5.2)
RBC #/AREA URNS AUTO: ABNORMAL /HPF
RH BLD: POSITIVE
RH BLD: POSITIVE
SODIUM SERPL-SCNC: 139 MMOL/L (ref 137–147)
SP GR UR STRIP.AUTO: 1.02 (ref 1–1.04)
SPECIMEN EXPIRATION DATE: NORMAL
UROBILINOGEN UA: NEGATIVE MG/DL
WBC # BLD AUTO: 6.6 THOUSAND/UL (ref 4.5–11)
WBC #/AREA URNS AUTO: ABNORMAL /HPF

## 2021-05-25 PROCEDURE — 86850 RBC ANTIBODY SCREEN: CPT

## 2021-05-25 PROCEDURE — 85730 THROMBOPLASTIN TIME PARTIAL: CPT

## 2021-05-25 PROCEDURE — 85610 PROTHROMBIN TIME: CPT

## 2021-05-25 PROCEDURE — 80053 COMPREHEN METABOLIC PANEL: CPT

## 2021-05-25 PROCEDURE — 74177 CT ABD & PELVIS W/CONTRAST: CPT

## 2021-05-25 PROCEDURE — 81003 URINALYSIS AUTO W/O SCOPE: CPT

## 2021-05-25 PROCEDURE — 99285 EMERGENCY DEPT VISIT HI MDM: CPT

## 2021-05-25 PROCEDURE — 99282 EMERGENCY DEPT VISIT SF MDM: CPT

## 2021-05-25 PROCEDURE — 85025 COMPLETE CBC W/AUTO DIFF WBC: CPT

## 2021-05-25 PROCEDURE — 86900 BLOOD TYPING SEROLOGIC ABO: CPT

## 2021-05-25 PROCEDURE — G1004 CDSM NDSC: HCPCS

## 2021-05-25 PROCEDURE — 81025 URINE PREGNANCY TEST: CPT

## 2021-05-25 PROCEDURE — 81001 URINALYSIS AUTO W/SCOPE: CPT

## 2021-05-25 PROCEDURE — 36415 COLL VENOUS BLD VENIPUNCTURE: CPT

## 2021-05-25 PROCEDURE — 86901 BLOOD TYPING SEROLOGIC RH(D): CPT

## 2021-05-25 RX ADMIN — IOHEXOL 100 ML: 350 INJECTION, SOLUTION INTRAVENOUS at 12:39

## 2021-05-25 RX ADMIN — IOHEXOL 50 ML: 240 INJECTION, SOLUTION INTRATHECAL; INTRAVASCULAR; INTRAVENOUS; ORAL at 10:51

## 2021-05-25 NOTE — ED PROVIDER NOTES
History  Chief Complaint   Patient presents with    Rectal Bleeding     history of colitis; states left lower abdominal pain since Saturday; drank 2 beers that day;  also 2 days of blood in stool; no period for past 3 months also  43-year-old female history of asthma and colitis per patient presents secondary to noticing blood in her stools over the past 3 days associated with left lower quadrant pain  Patient denies any melena she denies any vomiting she has had nausea  Patient denies any fever chills  Patient denies any urinary symptoms hematuria or flank pain  Patient denies any vaginal bleeding actively  Patient states that in the past she has had similar symptoms she was referred for colon and endoscopy but patient states she was too afraid to go in did not ago for the testing  Patient is mostly complaining of left lower quadrant pain on my evaluation  Prior to Admission Medications   Prescriptions Last Dose Informant Patient Reported? Taking?    albuterol (PROVENTIL HFA,VENTOLIN HFA) 90 mcg/act inhaler   Yes No   Sig: Inhale 2 puffs every 6 (six) hours as needed for wheezing   dicyclomine (BENTYL) 20 mg tablet   No No   Sig: Take 1 tablet (20 mg total) by mouth 2 (two) times a day as needed (abdominal pain)   lidocaine (LIDODERM) 5 %   No No   Sig: Apply 1 patch topically daily Remove & Discard patch within 12 hours or as directed by MD   methocarbamol (ROBAXIN) 500 mg tablet   No No   Sig: Take 1 tablet (500 mg total) by mouth 2 (two) times a day   naproxen (NAPROSYN) 500 mg tablet   No No   Sig: Take 1 tablet (500 mg total) by mouth every 12 (twelve) hours as needed for mild pain or moderate pain   Patient not taking: Reported on 1/18/2021   naproxen (NAPROSYN) 500 mg tablet   No No   Sig: Take 1 tablet (500 mg total) by mouth 2 (two) times a day with meals   ondansetron (ZOFRAN-ODT) 4 mg disintegrating tablet   No No   Sig: Take 1 tablet (4 mg total) by mouth every 8 (eight) hours as needed for nausea or vomiting   ondansetron (ZOFRAN-ODT) 4 mg disintegrating tablet   No No   Sig: Take 1 tablet (4 mg total) by mouth every 6 (six) hours as needed for nausea or vomiting      Facility-Administered Medications: None       Past Medical History:   Diagnosis Date    Asthma     Colitis        Past Surgical History:   Procedure Laterality Date    ABDOMINAL SURGERY         History reviewed  No pertinent family history  I have reviewed and agree with the history as documented  E-Cigarette/Vaping    E-Cigarette Use Never User      E-Cigarette/Vaping Substances    Nicotine No     THC No     CBD No     Flavoring No     Other No     Unknown No      Social History     Tobacco Use    Smoking status: Never Smoker    Smokeless tobacco: Never Used   Substance Use Topics    Alcohol use: Yes    Drug use: No       Review of Systems   Constitutional: Negative for chills and fever  HENT: Negative for congestion  Eyes: Negative for visual disturbance  Respiratory: Negative for shortness of breath  Cardiovascular: Negative for chest pain  Gastrointestinal: Positive for abdominal pain and blood in stool  Endocrine: Negative for cold intolerance  Genitourinary: Negative for frequency  Musculoskeletal: Negative for gait problem  Skin: Negative for rash  Neurological: Negative for dizziness  Psychiatric/Behavioral: Negative for behavioral problems and confusion  Physical Exam  Physical Exam  Vitals signs and nursing note reviewed  Constitutional:       Appearance: She is well-developed  HENT:      Head: Normocephalic and atraumatic  Nose: Nose normal    Eyes:      Conjunctiva/sclera: Conjunctivae normal       Pupils: Pupils are equal, round, and reactive to light  Neck:      Musculoskeletal: Normal range of motion and neck supple  Cardiovascular:      Rate and Rhythm: Normal rate and regular rhythm  Heart sounds: Normal heart sounds     Pulmonary:      Effort: Pulmonary effort is normal       Breath sounds: Normal breath sounds  Abdominal:      General: Bowel sounds are normal       Palpations: Abdomen is soft  Comments: Patient with some tenderness to palpation in the left upper quadrant and left lower quadrant   Genitourinary:     Rectum: Normal  Guaiac result negative  Musculoskeletal: Normal range of motion  Skin:     General: Skin is warm and dry  Capillary Refill: Capillary refill takes less than 2 seconds  Neurological:      Mental Status: She is alert and oriented to person, place, and time     Psychiatric:         Behavior: Behavior normal          Vital Signs  ED Triage Vitals [05/25/21 1003]   Temperature Pulse Respirations Blood Pressure SpO2   (!) 97 2 °F (36 2 °C) 65 16 151/76 99 %      Temp Source Heart Rate Source Patient Position - Orthostatic VS BP Location FiO2 (%)   Tympanic Monitor Sitting Left arm --      Pain Score       8           Vitals:    05/25/21 1003   BP: 151/76   Pulse: 65   Patient Position - Orthostatic VS: Sitting         Visual Acuity      ED Medications  Medications   iohexol (OMNIPAQUE) 240 MG/ML solution 50 mL (50 mL Oral Given 5/25/21 1051)   iohexol (OMNIPAQUE) 350 MG/ML injection (SINGLE-DOSE) 100 mL (100 mL Intravenous Given 5/25/21 1239)       Diagnostic Studies  Results Reviewed     Procedure Component Value Units Date/Time    Urine Microscopic [[id number]]  (Abnormal) Collected: 05/25/21 1038    Lab Status: Final result Specimen: Urine, Clean Catch Updated: 05/25/21 1136     RBC, UA 0-1 /hpf      WBC, UA None Seen /hpf      Epithelial Cells Occasional /hpf      Bacteria, UA Occasional /hpf      MUCUS THREADS Moderate    Comprehensive metabolic panel [[id number]]  (Normal) Collected: 05/25/21 1038    Lab Status: Final result Specimen: Blood from Arm, Right Updated: 05/25/21 1122     Sodium 139 mmol/L      Potassium 3 8 mmol/L      Chloride 107 mmol/L      CO2 27 mmol/L      ANION GAP 5 mmol/L      BUN 16 mg/dL Creatinine 0 80 mg/dL      Glucose 82 mg/dL      Calcium 9 2 mg/dL      AST 22 U/L      ALT 11 U/L      Alkaline Phosphatase 70 U/L      Total Protein 7 3 g/dL      Albumin 3 9 g/dL      Total Bilirubin 0 84 mg/dL      eGFR 87 ml/min/1 73sq m     Narrative:      Hemolysis  National Kidney Disease Foundation guidelines for Chronic Kidney Disease (CKD):     Stage 1 with normal or high GFR (GFR > 90 mL/min/1 73 square meters)    Stage 2 Mild CKD (GFR = 60-89 mL/min/1 73 square meters)    Stage 3A Moderate CKD (GFR = 45-59 mL/min/1 73 square meters)    Stage 3B Moderate CKD (GFR = 30-44 mL/min/1 73 square meters)    Stage 4 Severe CKD (GFR = 15-29 mL/min/1 73 square meters)    Stage 5 End Stage CKD (GFR <15 mL/min/1 73 square meters)  Note: GFR calculation is accurate only with a steady state creatinine    POCT pregnancy, urine [[id number]]  (Normal) Resulted: 05/25/21 1117    Lab Status: Final result Updated: 05/25/21 1117     EXT PREG TEST UR (Ref: Negative) negative     Control valid    Protime-INR [[id number]]  (Normal) Collected: 05/25/21 1038    Lab Status: Final result Specimen: Blood from Arm, Right Updated: 05/25/21 1116     Protime 13 1 seconds      INR 0 98    APTT [[id number]]  (Normal) Collected: 05/25/21 1038    Lab Status: Final result Specimen: Blood from Arm, Right Updated: 05/25/21 1116     PTT 28 seconds     UA w Reflex to Microscopic w Reflex to Culture [[id number]]  (Abnormal) Collected: 05/25/21 1038    Lab Status: Final result Specimen: Urine, Clean Catch Updated: 05/25/21 1114     Color, UA Yellow     Clarity, UA Clear     Specific [place], UA 1 020     pH, UA 6 0     Leukocytes, UA Negative     Nitrite, UA Negative     Protein, UA Negative mg/dl      Glucose, UA Negative mg/dl      Ketones, UA Negative mg/dl      Bilirubin, UA Negative     Blood, UA 50 0     UROBILINOGEN UA Negative mg/dL     CBC and differential [[id number]]  (Normal) Collected: 05/25/21 1038    Lab Status: Final result Specimen: Blood from Arm, Right Updated: 05/25/21 1114     WBC 6 60 Thousand/uL      RBC 4 26 Million/uL      Hemoglobin 13 5 g/dL      Hematocrit 40 1 %      MCV 94 fL      MCH 31 6 pg      MCHC 33 6 g/dL      RDW 12 8 %      MPV 9 3 fL      Platelets 125 Thousands/uL      Neutrophils Relative 56 %      Lymphocytes Relative 30 %      Monocytes Relative 9 %      Eosinophils Relative 5 %      Basophils Relative 1 %      Neutrophils Absolute 3 70 Thousands/µL      Lymphocytes Absolute 2 00 Thousands/µL      Monocytes Absolute 0 60 Thousand/µL      Eosinophils Absolute 0 30 Thousand/µL      Basophils Absolute 0 00 Thousands/µL                  CT abdomen pelvis with contrast   Final Result by Eduardo Ferro MD (05/25 1330)      No acute intra-abdominal pathology  Specifically, no source of left lower quadrant pain identified  Workstation performed: [id number]                    Procedures  Procedures         ED Course                                           MDM  Number of Diagnoses or Management Options  Diagnosis management comments: Lab work demonstrates no significant findings normal hemoglobin hematocrit normal coagulation studies normal platelet patient had urinalysis unremarkable guaiac test demonstrated negative  Patient had a CT abdomen pelvis with contrast demonstrated no acute findings  Impression is blood in stool certain etiology  Plan will be to discharge home she will need GI consult follow-up sometime in the next week    Return to the ER for worsening abdominal pain fever or worsening bleeding noted in rectum      Disposition  Final diagnoses:   Rectal bleeding     Time reflects when diagnosis was documented in both MDM as applicable and the Disposition within this note     Time User Action Codes Description Comment    5/25/2021  1:39 PM Armida Beverly 83 [K62 5] Rectal bleeding       ED Disposition     ED Disposition Condition Date/Time Comment    Discharge Stable Tue May 25, 2021 1:39 PM [address]  [facility] discharge to home/self care  Follow-up Information     Follow up With Specialties Details Why Contact Info    Kandice Gonzalez MD Family Medicine Schedule an appointment as soon as possible for a visit in 2 days  [place] [id number]  [phone]            Patient's Medications   Discharge Prescriptions    No medications on file     No discharge procedures on file      PDMP Review     None          ED Provider  Electronically Signed by           Yuly Benavides MD  05/25/21 2630 [facility],[address] [facility], MD  05/25/21 800 [facility], MD  05/25/21 1341

## 2021-05-25 NOTE — Clinical Note
Bobo Christopher was seen and treated in our emergency department on 5/25/2021  Diagnosis:     Cris Ace  may return to work on return date  She may return on this date: 05/27/2021         If you have any questions or concerns, please don't hesitate to call        Malina Orr RN    ______________________________           _______________          _______________  Hospital Representative                              Date                                Time

## 2021-05-25 NOTE — Clinical Note
Denzel Sánchez was seen and treated in our emergency department on 5/25/2021  Diagnosis:     Jamir Nair  may return to work on return date  She may return on this date: 05/27/2021         If you have any questions or concerns, please don't hesitate to call        Prince Russo RN    ______________________________           _______________          _______________  Hospital Representative                              Date                                Time

## 2021-05-25 NOTE — DISCHARGE INSTRUCTIONS
Follow up with your physician this week and will need a referral to a Gastroenterology specialist for follow up

## 2021-07-30 ENCOUNTER — APPOINTMENT (EMERGENCY)
Dept: RADIOLOGY | Facility: HOSPITAL | Age: 51
End: 2021-07-30
Payer: COMMERCIAL

## 2021-07-30 ENCOUNTER — APPOINTMENT (EMERGENCY)
Dept: CT IMAGING | Facility: HOSPITAL | Age: 51
End: 2021-07-30
Payer: COMMERCIAL

## 2021-07-30 ENCOUNTER — HOSPITAL ENCOUNTER (EMERGENCY)
Facility: HOSPITAL | Age: 51
Discharge: HOME | End: 2021-07-30
Attending: EMERGENCY MEDICINE | Admitting: EMERGENCY MEDICINE
Payer: COMMERCIAL

## 2021-07-30 VITALS
TEMPERATURE: 98.4 F | SYSTOLIC BLOOD PRESSURE: 123 MMHG | OXYGEN SATURATION: 99 % | RESPIRATION RATE: 18 BRPM | HEART RATE: 71 BPM | DIASTOLIC BLOOD PRESSURE: 85 MMHG | WEIGHT: 150.5 LBS

## 2021-07-30 DIAGNOSIS — R10.13 EPIGASTRIC PAIN: Primary | ICD-10-CM

## 2021-07-30 LAB
ALBUMIN SERPL BCP-MCNC: 4.4 G/DL (ref 3–5.2)
ALP SERPL-CCNC: 88 U/L (ref 43–122)
ALT SERPL W P-5'-P-CCNC: 12 U/L
AMPHETAMINES SERPL QL SCN: NEGATIVE
ANION GAP SERPL CALCULATED.3IONS-SCNC: 8 MMOL/L (ref 5–14)
AST SERPL W P-5'-P-CCNC: 19 U/L (ref 14–36)
BACTERIA UR QL AUTO: ABNORMAL /HPF
BARBITURATES UR QL: NEGATIVE
BASOPHILS # BLD AUTO: 0 THOUSANDS/ΜL (ref 0–0.1)
BASOPHILS NFR BLD AUTO: 0 % (ref 0–1)
BENZODIAZ UR QL: NEGATIVE
BILIRUB SERPL-MCNC: 0.5 MG/DL
BILIRUB UR QL STRIP: NEGATIVE
BUN SERPL-MCNC: 15 MG/DL (ref 5–25)
CALCIUM SERPL-MCNC: 9.1 MG/DL (ref 8.4–10.2)
CHLORIDE SERPL-SCNC: 107 MMOL/L (ref 97–108)
CLARITY UR: CLEAR
CO2 SERPL-SCNC: 27 MMOL/L (ref 22–30)
COCAINE UR QL: NEGATIVE
COLOR UR: YELLOW
CREAT SERPL-MCNC: 0.86 MG/DL (ref 0.6–1.2)
EOSINOPHIL # BLD AUTO: 0.7 THOUSAND/ΜL (ref 0–0.4)
EOSINOPHIL NFR BLD AUTO: 9 % (ref 0–6)
ERYTHROCYTE [DISTWIDTH] IN BLOOD BY AUTOMATED COUNT: 14.6 %
GFR SERPL CREATININE-BSD FRML MDRD: 80 ML/MIN/1.73SQ M
GLUCOSE SERPL-MCNC: 83 MG/DL (ref 70–99)
GLUCOSE UR STRIP-MCNC: NEGATIVE MG/DL
HCG SERPL QL: NEGATIVE
HCT VFR BLD AUTO: 43.7 % (ref 36–46)
HGB BLD-MCNC: 14.3 G/DL (ref 12–16)
HGB UR QL STRIP.AUTO: 50
KETONES UR STRIP-MCNC: NEGATIVE MG/DL
LEUKOCYTE ESTERASE UR QL STRIP: NEGATIVE
LIPASE SERPL-CCNC: 115 U/L (ref 23–300)
LYMPHOCYTES # BLD AUTO: 2.4 THOUSANDS/ΜL (ref 0.5–4)
LYMPHOCYTES NFR BLD AUTO: 33 % (ref 25–45)
MCH RBC QN AUTO: 32.9 PG (ref 26–34)
MCHC RBC AUTO-ENTMCNC: 32.8 G/DL (ref 31–36)
MCV RBC AUTO: 100 FL (ref 80–100)
METHADONE UR QL: NEGATIVE
MONOCYTES # BLD AUTO: 0.6 THOUSAND/ΜL (ref 0.2–0.9)
MONOCYTES NFR BLD AUTO: 8 % (ref 1–10)
MUCOUS THREADS UR QL AUTO: ABNORMAL
NEUTROPHILS # BLD AUTO: 3.6 THOUSANDS/ΜL (ref 1.8–7.8)
NEUTS SEG NFR BLD AUTO: 49 % (ref 45–65)
NITRITE UR QL STRIP: NEGATIVE
NON-SQ EPI CELLS URNS QL MICRO: ABNORMAL /HPF
OPIATES UR QL SCN: NEGATIVE
OXYCODONE+OXYMORPHONE UR QL SCN: NEGATIVE
PCP UR QL: NEGATIVE
PH UR STRIP.AUTO: 5 [PH]
PLATELET # BLD AUTO: 282 THOUSANDS/UL (ref 150–450)
PLATELET BLD QL SMEAR: ADEQUATE
PMV BLD AUTO: 9.1 FL (ref 8.9–12.7)
POTASSIUM SERPL-SCNC: 3.8 MMOL/L (ref 3.6–5)
PROT SERPL-MCNC: 8 G/DL (ref 5.9–8.4)
PROT UR STRIP-MCNC: NEGATIVE MG/DL
RBC # BLD AUTO: 4.35 MILLION/UL (ref 4–5.2)
RBC #/AREA URNS AUTO: ABNORMAL /HPF
RBC MORPH BLD: NORMAL
SODIUM SERPL-SCNC: 142 MMOL/L (ref 137–147)
SP GR UR STRIP.AUTO: 1.02 (ref 1–1.04)
THC UR QL: NEGATIVE
TROPONIN I SERPL-MCNC: <0.01 NG/ML (ref 0–0.03)
UROBILINOGEN UA: NEGATIVE MG/DL
WBC # BLD AUTO: 7.3 THOUSAND/UL (ref 4.5–11)
WBC #/AREA URNS AUTO: ABNORMAL /HPF

## 2021-07-30 PROCEDURE — 96374 THER/PROPH/DIAG INJ IV PUSH: CPT

## 2021-07-30 PROCEDURE — 80307 DRUG TEST PRSMV CHEM ANLYZR: CPT | Performed by: PHYSICIAN ASSISTANT

## 2021-07-30 PROCEDURE — 99285 EMERGENCY DEPT VISIT HI MDM: CPT

## 2021-07-30 PROCEDURE — 96375 TX/PRO/DX INJ NEW DRUG ADDON: CPT

## 2021-07-30 PROCEDURE — 36415 COLL VENOUS BLD VENIPUNCTURE: CPT | Performed by: PHYSICIAN ASSISTANT

## 2021-07-30 PROCEDURE — 96361 HYDRATE IV INFUSION ADD-ON: CPT

## 2021-07-30 PROCEDURE — 84703 CHORIONIC GONADOTROPIN ASSAY: CPT | Performed by: PHYSICIAN ASSISTANT

## 2021-07-30 PROCEDURE — 81003 URINALYSIS AUTO W/O SCOPE: CPT | Performed by: PHYSICIAN ASSISTANT

## 2021-07-30 PROCEDURE — 81001 URINALYSIS AUTO W/SCOPE: CPT | Performed by: PHYSICIAN ASSISTANT

## 2021-07-30 PROCEDURE — 84484 ASSAY OF TROPONIN QUANT: CPT | Performed by: PHYSICIAN ASSISTANT

## 2021-07-30 PROCEDURE — 99284 EMERGENCY DEPT VISIT MOD MDM: CPT | Performed by: PHYSICIAN ASSISTANT

## 2021-07-30 PROCEDURE — 80053 COMPREHEN METABOLIC PANEL: CPT | Performed by: PHYSICIAN ASSISTANT

## 2021-07-30 PROCEDURE — 93005 ELECTROCARDIOGRAM TRACING: CPT

## 2021-07-30 PROCEDURE — 71045 X-RAY EXAM CHEST 1 VIEW: CPT

## 2021-07-30 PROCEDURE — 83690 ASSAY OF LIPASE: CPT | Performed by: PHYSICIAN ASSISTANT

## 2021-07-30 PROCEDURE — 85025 COMPLETE CBC W/AUTO DIFF WBC: CPT | Performed by: PHYSICIAN ASSISTANT

## 2021-07-30 RX ORDER — MAGNESIUM HYDROXIDE/ALUMINUM HYDROXICE/SIMETHICONE 120; 1200; 1200 MG/30ML; MG/30ML; MG/30ML
30 SUSPENSION ORAL ONCE
Status: COMPLETED | OUTPATIENT
Start: 2021-07-30 | End: 2021-07-30

## 2021-07-30 RX ORDER — KETOROLAC TROMETHAMINE 30 MG/ML
30 INJECTION, SOLUTION INTRAMUSCULAR; INTRAVENOUS ONCE
Status: COMPLETED | OUTPATIENT
Start: 2021-07-30 | End: 2021-07-30

## 2021-07-30 RX ORDER — OMEPRAZOLE 20 MG/1
20 CAPSULE, DELAYED RELEASE ORAL DAILY
Qty: 30 CAPSULE | Refills: 0 | Status: SHIPPED | OUTPATIENT
Start: 2021-07-30

## 2021-07-30 RX ORDER — SUCRALFATE 1 G/1
1 TABLET ORAL ONCE
Status: COMPLETED | OUTPATIENT
Start: 2021-07-30 | End: 2021-07-30

## 2021-07-30 RX ORDER — ONDANSETRON 4 MG/1
4 TABLET, FILM COATED ORAL EVERY 12 HOURS PRN
Qty: 12 TABLET | Refills: 0 | Status: SHIPPED | OUTPATIENT
Start: 2021-07-30 | End: 2022-03-08 | Stop reason: SDUPTHER

## 2021-07-30 RX ORDER — SODIUM CHLORIDE 9 MG/ML
250 INJECTION, SOLUTION INTRAVENOUS CONTINUOUS
Status: DISCONTINUED | OUTPATIENT
Start: 2021-07-30 | End: 2021-07-30 | Stop reason: HOSPADM

## 2021-07-30 RX ORDER — SUCRALFATE 1 G/1
1 TABLET ORAL 4 TIMES DAILY
Qty: 40 TABLET | Refills: 0 | Status: SHIPPED | OUTPATIENT
Start: 2021-07-30

## 2021-07-30 RX ORDER — ACETAMINOPHEN 325 MG/1
650 TABLET ORAL ONCE
Status: COMPLETED | OUTPATIENT
Start: 2021-07-30 | End: 2021-07-30

## 2021-07-30 RX ORDER — ONDANSETRON 2 MG/ML
4 INJECTION INTRAMUSCULAR; INTRAVENOUS ONCE
Status: COMPLETED | OUTPATIENT
Start: 2021-07-30 | End: 2021-07-30

## 2021-07-30 RX ADMIN — ACETAMINOPHEN 650 MG: 325 TABLET ORAL at 10:49

## 2021-07-30 RX ADMIN — ALUMINUM HYDROXIDE, MAGNESIUM HYDROXIDE, AND SIMETHICONE 30 ML: 200; 200; 20 SUSPENSION ORAL at 12:26

## 2021-07-30 RX ADMIN — FAMOTIDINE 20 MG: 10 INJECTION INTRAVENOUS at 10:35

## 2021-07-30 RX ADMIN — SUCRALFATE 1 G: 1 TABLET ORAL at 12:26

## 2021-07-30 RX ADMIN — KETOROLAC TROMETHAMINE 30 MG: 30 INJECTION, SOLUTION INTRAMUSCULAR; INTRAVENOUS at 11:04

## 2021-07-30 RX ADMIN — ONDANSETRON 4 MG: 2 INJECTION INTRAMUSCULAR; INTRAVENOUS at 10:35

## 2021-07-30 RX ADMIN — SODIUM CHLORIDE 250 ML/HR: 0.9 INJECTION, SOLUTION INTRAVENOUS at 10:27

## 2021-07-30 NOTE — ED PROVIDER NOTES
History  Chief Complaint   Patient presents with    Chest Pain     Pt  reports intermittent chest pain x2 days in chest, back, and neck  Pt  reports worsening with breathing and hx of asthma  Pt with  Nausea no vomiting abdomen pain and chest pain intermittent x 2 days       History provided by:  Patient  Nausea  The primary symptoms include abdominal pain and nausea  The illness began 3 to 5 days ago  The onset was gradual    The illness does not include chills  Associated medical issues do not include inflammatory bowel disease  Prior to Admission Medications   Prescriptions Last Dose Informant Patient Reported? Taking?    albuterol (PROVENTIL HFA,VENTOLIN HFA) 90 mcg/act inhaler   Yes No   Sig: Inhale 2 puffs every 6 (six) hours as needed for wheezing   dicyclomine (BENTYL) 20 mg tablet   No No   Sig: Take 1 tablet (20 mg total) by mouth 2 (two) times a day as needed (abdominal pain)   lidocaine (LIDODERM) 5 %   No No   Sig: Apply 1 patch topically daily Remove & Discard patch within 12 hours or as directed by MD   methocarbamol (ROBAXIN) 500 mg tablet   No No   Sig: Take 1 tablet (500 mg total) by mouth 2 (two) times a day   naproxen (NAPROSYN) 500 mg tablet   No No   Sig: Take 1 tablet (500 mg total) by mouth every 12 (twelve) hours as needed for mild pain or moderate pain   Patient not taking: Reported on 1/18/2021   naproxen (NAPROSYN) 500 mg tablet   No No   Sig: Take 1 tablet (500 mg total) by mouth 2 (two) times a day with meals   ondansetron (ZOFRAN-ODT) 4 mg disintegrating tablet   No No   Sig: Take 1 tablet (4 mg total) by mouth every 8 (eight) hours as needed for nausea or vomiting   ondansetron (ZOFRAN-ODT) 4 mg disintegrating tablet   No No   Sig: Take 1 tablet (4 mg total) by mouth every 6 (six) hours as needed for nausea or vomiting      Facility-Administered Medications: None       Past Medical History:   Diagnosis Date    Asthma     Colitis        Past Surgical History:   Procedure Laterality Date    ABDOMINAL SURGERY         History reviewed  No pertinent family history  I have reviewed and agree with the history as documented  E-Cigarette/Vaping    E-Cigarette Use Never User      E-Cigarette/Vaping Substances    Nicotine No     THC No     CBD No     Flavoring No     Other No     Unknown No      Social History     Tobacco Use    Smoking status: Never Smoker    Smokeless tobacco: Never Used   Vaping Use    Vaping Use: Never used   Substance Use Topics    Alcohol use: Yes    Drug use: No       Review of Systems   Constitutional: Negative  Negative for chills  HENT: Negative  Eyes: Negative  Respiratory: Negative  Cardiovascular: Positive for chest pain  Gastrointestinal: Positive for abdominal pain and nausea  Endocrine: Negative  Genitourinary: Negative  Musculoskeletal: Negative  Skin: Negative  Allergic/Immunologic: Negative  Neurological: Negative  Hematological: Negative  Psychiatric/Behavioral: Negative  All other systems reviewed and are negative  Physical Exam  Physical Exam  Vitals and nursing note reviewed  Constitutional:       Appearance: Normal appearance  She is normal weight  Comments: 250pm  Pt feeling better no pain  Pt declines ct scan and wants to leave    HENT:      Head: Normocephalic and atraumatic  Right Ear: Tympanic membrane, ear canal and external ear normal       Left Ear: Tympanic membrane, ear canal and external ear normal       Nose: Nose normal       Mouth/Throat:      Mouth: Mucous membranes are moist       Pharynx: Oropharynx is clear  Eyes:      Extraocular Movements: Extraocular movements intact  Conjunctiva/sclera: Conjunctivae normal       Pupils: Pupils are equal, round, and reactive to light  Cardiovascular:      Rate and Rhythm: Normal rate and regular rhythm  Pulses: Normal pulses  Heart sounds: Normal heart sounds     Pulmonary:      Effort: Pulmonary effort is normal       Breath sounds: Normal breath sounds  Abdominal:      General: Abdomen is flat  Bowel sounds are normal       Palpations: Abdomen is soft  Comments: Mid epigastric pain  Sternal pain    Musculoskeletal:         General: Normal range of motion  Cervical back: Normal range of motion and neck supple  Skin:     General: Skin is warm  Capillary Refill: Capillary refill takes less than 2 seconds  Neurological:      General: No focal deficit present  Mental Status: She is alert and oriented to person, place, and time     Psychiatric:         Mood and Affect: Mood normal          Behavior: Behavior normal          Vital Signs  ED Triage Vitals   Temperature Pulse Respirations Blood Pressure SpO2   07/30/21 0952 07/30/21 0952 07/30/21 0952 07/30/21 0952 07/30/21 0952   98 4 °F (36 9 °C) 92 18 125/87 100 %      Temp Source Heart Rate Source Patient Position - Orthostatic VS BP Location FiO2 (%)   07/30/21 0952 07/30/21 0952 07/30/21 0952 07/30/21 0952 --   Oral Monitor Sitting Left arm       Pain Score       07/30/21 1049       8           Vitals:    07/30/21 1115 07/30/21 1215 07/30/21 1315 07/30/21 1400   BP: 131/82 109/79 138/81 123/85   Pulse: 66 65 72 71   Patient Position - Orthostatic VS: Sitting Sitting  Sitting         Visual Acuity      ED Medications  Medications   ondansetron (ZOFRAN) injection 4 mg (4 mg Intravenous Given 7/30/21 1035)   famotidine (PEPCID) injection 20 mg (20 mg Intravenous Given 7/30/21 1035)   acetaminophen (TYLENOL) tablet 650 mg (650 mg Oral Given 7/30/21 1049)   ketorolac (TORADOL) injection 30 mg (30 mg Intravenous Given 7/30/21 1104)   aluminum-magnesium hydroxide-simethicone (MYLANTA) oral suspension 30 mL (30 mL Oral Given 7/30/21 1226)   sucralfate (CARAFATE) tablet 1 g (1 g Oral Given 7/30/21 1226)       Diagnostic Studies  Results Reviewed     Procedure Component Value Units Date/Time    hCG, qualitative pregnancy [[id number]]  (Normal) Collected: 07/30/21 1110    Lab Status: Final result Specimen: Blood from Arm, Left Updated: 07/30/21 1223     Preg, Serum Negative    Smear Review(Phlebs Do Not Order) [[id number]] Collected: 07/30/21 1027    Lab Status: Final result Specimen: Blood from Arm, Right Updated: 07/30/21 1215     RBC Morphology Normal     Platelet Estimate Adequate    Urine Microscopic [[id number]]  (Abnormal) Collected: 07/30/21 1034    Lab Status: Final result Specimen: Urine, Clean Catch Updated: 07/30/21 1210     RBC, UA 1-2 /hpf      WBC, UA 0-1 /hpf      Epithelial Cells Occasional /hpf      Bacteria, UA Occasional /hpf      MUCUS THREADS Occasional    Troponin I [[id number]]  (Normal) Collected: 07/30/21 1110    Lab Status: Final result Specimen: Blood from Arm, Left Updated: 07/30/21 1203     Troponin I <0 01 ng/mL     Lipase [[id number]]  (Normal) Collected: 07/30/21 1110    Lab Status: Final result Specimen: Blood from Arm, Left Updated: 07/30/21 1151     Lipase 115 u/L     Comprehensive metabolic panel [[id number]]  (Normal) Collected: 07/30/21 1110    Lab Status: Final result Specimen: Blood from Arm, Left Updated: 07/30/21 1151     Sodium 142 mmol/L      Potassium 3 8 mmol/L      Chloride 107 mmol/L      CO2 27 mmol/L      ANION GAP 8 mmol/L      BUN 15 mg/dL      Creatinine 0 86 mg/dL      Glucose 83 mg/dL      Calcium 9 1 mg/dL      AST 19 U/L      ALT 12 U/L      Alkaline Phosphatase 88 U/L      Total Protein 8 0 g/dL      Albumin 4 4 g/dL      Total Bilirubin 0 50 mg/dL      eGFR 80 ml/min/1 73sq m     Narrative:      [place] guidelines for Chronic Kidney Disease (CKD):     Stage 1 with normal or high GFR (GFR > 90 mL/min/1 73 square meters)    Stage 2 Mild CKD (GFR = 60-89 mL/min/1 73 square meters)    Stage 3A Moderate CKD (GFR = 45-59 mL/min/1 73 square meters)    Stage 3B Moderate CKD (GFR = 30-44 mL/min/1 73 square meters)    Stage 4 Severe CKD (GFR = 15-29 mL/min/1 73 square meters)    Stage 5 End Stage CKD (GFR <15 mL/min/1 73 square meters)  Note: GFR calculation is accurate only with a steady state creatinine    Rapid drug screen, urine [[id number]]  (Normal) Collected: 07/30/21 1016    Lab Status: Final result Specimen: Urine, Clean Catch Updated: 07/30/21 1139     Amph/Meth UR Negative     Barbiturate Ur Negative     Benzodiazepine Urine Negative     Cocaine Urine Negative     Methadone Urine Negative     Opiate Urine Negative     PCP Ur Negative     THC Urine Negative     Oxycodone Urine Negative    Narrative:      FOR MEDICAL PURPOSES ONLY  IF CONFIRMATION NEEDED PLEASE CONTACT THE LAB WITHIN 5 DAYS      Drug Screen Cutoff Levels:  AMPHETAMINE/METHAMPHETAMINES  1000 ng/mL  BARBITURATES     200 ng/mL  BENZODIAZEPINES     200 ng/mL  COCAINE      300 ng/mL  METHADONE      300 ng/mL  OPIATES      300 ng/mL  PHENCYCLIDINE     25 ng/mL  THC       50 ng/mL  OXYCODONE      100 ng/mL    UA w Reflex to Microscopic w Reflex to Culture [[id number]]  (Abnormal) Collected: 07/30/21 1034    Lab Status: Final result Specimen: Urine, Clean Catch Updated: 07/30/21 1116     Color, UA Yellow     Clarity, UA Clear     Specific [place], UA 1 020     pH, UA 5 0     Leukocytes, UA Negative     Nitrite, UA Negative     Protein, UA Negative mg/dl      Glucose, UA Negative mg/dl      Ketones, UA Negative mg/dl      Bilirubin, UA Negative     Blood, UA 50 0     UROBILINOGEN UA Negative mg/dL     CBC and differential [[id number]]  (Abnormal) Collected: 07/30/21 1027    Lab Status: Final result Specimen: Blood from Arm, Right Updated: 07/30/21 1103     WBC 7 30 Thousand/uL      RBC 4 35 Million/uL      Hemoglobin 14 3 g/dL      Hematocrit 43 7 %       fL      MCH 32 9 pg      MCHC 32 8 g/dL      RDW 14 6 %      MPV 9 1 fL      Platelets 679 Thousands/uL      Neutrophils Relative 49 %      Lymphocytes Relative 33 %      Monocytes Relative 8 %      Eosinophils Relative 9 %      Basophils Relative 0 %      Neutrophils Absolute 3 60 Thousands/µL      Lymphocytes Absolute 2 40 Thousands/µL      Monocytes Absolute 0 60 Thousand/µL      Eosinophils Absolute 0 70 Thousand/µL      Basophils Absolute 0 00 Thousands/µL                  XR chest 1 view portable   Final Result by Casper Peralta MD (07/30 1205)   No acute cardiopulmonary disease  Findings are stable            Workstation performed: [id number]                    Procedures  Procedures         ED Course                             SBIRT 20yo+      Most Recent Value   SBIRT (24 yo +)   In order to provide better care to our patients, we are screening all of our patients for alcohol and drug use  Would it be okay to ask you these screening questions? No Filed at: 07/30/2021 1046                    MDM    Disposition  Final diagnoses:   Epigastric pain     Time reflects when diagnosis was documented in both MDM as applicable and the Disposition within this note     Time User Action Codes Description Comment    7/30/2021  2:52 PM Gerson Coffey  Add [R10 13] Epigastric pain       ED Disposition     ED Disposition Condition Date/Time Comment    Discharge Stable Fri Jul 30, 2021  2:53 PM [facility] discharge to home/self care              Follow-up Information     Follow up With Specialties Details Why Hardik Tijerina MD Family Medicine  return if condition worsens [id number] [phone]            Discharge Medication List as of 7/30/2021  2:53 PM      START taking these medications    Details   omeprazole (PriLOSEC) 20 mg delayed release capsule Take 1 capsule (20 mg total) by mouth daily, Starting Fri 7/30/2021, Print      ondansetron (ZOFRAN) 4 mg tablet Take 1 tablet (4 mg total) by mouth every 12 (twelve) hours as needed for nausea or vomiting, Starting Fri 7/30/2021, Print      sucralfate (CARAFATE) 1 g tablet Take 1 tablet (1 g total) by mouth 4 (four) times a day, Starting Fri 7/30/2021, Print         CONTINUE these medications which have NOT CHANGED    Details   albuterol (PROVENTIL HFA,VENTOLIN HFA) 90 mcg/act inhaler Inhale 2 puffs every 6 (six) hours as needed for wheezing, Historical Med      dicyclomine (BENTYL) 20 mg tablet Take 1 tablet (20 mg total) by mouth 2 (two) times a day as needed (abdominal pain), Starting Mon 1/18/2021, Normal      lidocaine (LIDODERM) 5 % Apply 1 patch topically daily Remove & Discard patch within 12 hours or as directed by MD, Starting Fri 5/14/2021, Normal      methocarbamol (ROBAXIN) 500 mg tablet Take 1 tablet (500 mg total) by mouth 2 (two) times a day, Starting Fri 5/14/2021, Normal      !! naproxen (NAPROSYN) 500 mg tablet Take 1 tablet (500 mg total) by mouth every 12 (twelve) hours as needed for mild pain or moderate pain, Starting Fri 2/28/2020, Print      !! naproxen (NAPROSYN) 500 mg tablet Take 1 tablet (500 mg total) by mouth 2 (two) times a day with meals, Starting Fri 5/14/2021, Normal      !! ondansetron (ZOFRAN-ODT) 4 mg disintegrating tablet Take 1 tablet (4 mg total) by mouth every 8 (eight) hours as needed for nausea or vomiting, Starting Fri 2/28/2020, Print      !! ondansetron (ZOFRAN-ODT) 4 mg disintegrating tablet Take 1 tablet (4 mg total) by mouth every 6 (six) hours as needed for nausea or vomiting, Starting Mon 1/18/2021, Normal       !! - Potential duplicate medications found  Please discuss with provider  No discharge procedures on file      PDMP Review     None          ED Provider  Electronically Signed by           She Olguin PA-C  07/30/21 3214

## 2021-08-02 LAB
ATRIAL RATE: 71 BPM
P AXIS: 21 DEGREES
PR INTERVAL: 120 MS
QRS AXIS: 26 DEGREES
QRSD INTERVAL: 84 MS
QT INTERVAL: 392 MS
QTC INTERVAL: 425 MS
T WAVE AXIS: 8 DEGREES
VENTRICULAR RATE: 71 BPM

## 2021-08-02 PROCEDURE — 93010 ELECTROCARDIOGRAM REPORT: CPT | Performed by: INTERNAL MEDICINE

## 2022-02-21 ENCOUNTER — HOSPITAL ENCOUNTER (EMERGENCY)
Facility: HOSPITAL | Age: 52
Discharge: HOME | End: 2022-02-21
Attending: EMERGENCY MEDICINE | Admitting: EMERGENCY MEDICINE
Payer: COMMERCIAL

## 2022-02-21 ENCOUNTER — APPOINTMENT (EMERGENCY)
Dept: CT IMAGING | Facility: HOSPITAL | Age: 52
End: 2022-02-21
Payer: COMMERCIAL

## 2022-02-21 ENCOUNTER — APPOINTMENT (EMERGENCY)
Dept: ULTRASOUND IMAGING | Facility: HOSPITAL | Age: 52
End: 2022-02-21
Payer: COMMERCIAL

## 2022-02-21 VITALS
RESPIRATION RATE: 18 BRPM | DIASTOLIC BLOOD PRESSURE: 81 MMHG | SYSTOLIC BLOOD PRESSURE: 136 MMHG | WEIGHT: 149.69 LBS | TEMPERATURE: 98.3 F | OXYGEN SATURATION: 97 % | HEART RATE: 67 BPM

## 2022-02-21 DIAGNOSIS — R10.32 LEFT LOWER QUADRANT PAIN: Primary | ICD-10-CM

## 2022-02-21 LAB
ALBUMIN SERPL BCP-MCNC: 3.6 G/DL (ref 3.5–5)
ALP SERPL-CCNC: 96 U/L (ref 46–116)
ALT SERPL W P-5'-P-CCNC: 17 U/L (ref 12–78)
ANION GAP SERPL CALCULATED.3IONS-SCNC: 9 MMOL/L (ref 4–13)
AST SERPL W P-5'-P-CCNC: 12 U/L (ref 5–45)
BACTERIA UR QL AUTO: ABNORMAL /HPF
BASOPHILS # BLD AUTO: 0.03 THOUSANDS/ΜL (ref 0–0.1)
BASOPHILS NFR BLD AUTO: 0 % (ref 0–1)
BILIRUB SERPL-MCNC: 0.39 MG/DL (ref 0.2–1)
BILIRUB UR QL STRIP: NEGATIVE
BUN SERPL-MCNC: 17 MG/DL (ref 5–25)
CALCIUM SERPL-MCNC: 8.2 MG/DL (ref 8.3–10.1)
CHLORIDE SERPL-SCNC: 109 MMOL/L (ref 100–108)
CLARITY UR: CLEAR
CO2 SERPL-SCNC: 28 MMOL/L (ref 21–32)
COLOR UR: YELLOW
CREAT SERPL-MCNC: 0.88 MG/DL (ref 0.6–1.3)
EOSINOPHIL # BLD AUTO: 0.36 THOUSAND/ΜL (ref 0–0.61)
EOSINOPHIL NFR BLD AUTO: 5 % (ref 0–6)
ERYTHROCYTE [DISTWIDTH] IN BLOOD BY AUTOMATED COUNT: 12.9 % (ref 11.6–15.1)
EXT PREG TEST URINE: NORMAL
EXT. CONTROL ED NAV: NORMAL
GFR SERPL CREATININE-BSD FRML MDRD: 77 ML/MIN/1.73SQ M
GLUCOSE SERPL-MCNC: 77 MG/DL (ref 65–140)
GLUCOSE UR STRIP-MCNC: NEGATIVE MG/DL
HCT VFR BLD AUTO: 41.3 % (ref 34.8–46.1)
HGB BLD-MCNC: 13.2 G/DL (ref 11.5–15.4)
HGB UR QL STRIP.AUTO: ABNORMAL
IMM GRANULOCYTES # BLD AUTO: 0.01 THOUSAND/UL (ref 0–0.2)
IMM GRANULOCYTES NFR BLD AUTO: 0 % (ref 0–2)
KETONES UR STRIP-MCNC: NEGATIVE MG/DL
LEUKOCYTE ESTERASE UR QL STRIP: NEGATIVE
LIPASE SERPL-CCNC: 165 U/L (ref 73–393)
LYMPHOCYTES # BLD AUTO: 2.32 THOUSANDS/ΜL (ref 0.6–4.47)
LYMPHOCYTES NFR BLD AUTO: 31 % (ref 14–44)
MCH RBC QN AUTO: 31.4 PG (ref 26.8–34.3)
MCHC RBC AUTO-ENTMCNC: 32 G/DL (ref 31.4–37.4)
MCV RBC AUTO: 98 FL (ref 82–98)
MONOCYTES # BLD AUTO: 0.63 THOUSAND/ΜL (ref 0.17–1.22)
MONOCYTES NFR BLD AUTO: 9 % (ref 4–12)
NEUTROPHILS # BLD AUTO: 4.07 THOUSANDS/ΜL (ref 1.85–7.62)
NEUTS SEG NFR BLD AUTO: 55 % (ref 43–75)
NITRITE UR QL STRIP: NEGATIVE
NON-SQ EPI CELLS URNS QL MICRO: ABNORMAL /HPF
NRBC BLD AUTO-RTO: 0 /100 WBCS
PH UR STRIP.AUTO: 6 [PH]
PLATELET # BLD AUTO: 284 THOUSANDS/UL (ref 149–390)
PMV BLD AUTO: 10.4 FL (ref 8.9–12.7)
POTASSIUM SERPL-SCNC: 4 MMOL/L (ref 3.5–5.3)
PROT SERPL-MCNC: 7.3 G/DL (ref 6.4–8.2)
PROT UR STRIP-MCNC: NEGATIVE MG/DL
RBC # BLD AUTO: 4.21 MILLION/UL (ref 3.81–5.12)
RBC #/AREA URNS AUTO: ABNORMAL /HPF
SODIUM SERPL-SCNC: 146 MMOL/L (ref 136–145)
SP GR UR STRIP.AUTO: 1.02 (ref 1–1.03)
UROBILINOGEN UR QL STRIP.AUTO: 0.2 E.U./DL
WBC # BLD AUTO: 7.42 THOUSAND/UL (ref 4.31–10.16)
WBC #/AREA URNS AUTO: ABNORMAL /HPF

## 2022-02-21 PROCEDURE — 36415 COLL VENOUS BLD VENIPUNCTURE: CPT | Performed by: PHYSICIAN ASSISTANT

## 2022-02-21 PROCEDURE — 83690 ASSAY OF LIPASE: CPT | Performed by: PHYSICIAN ASSISTANT

## 2022-02-21 PROCEDURE — 96366 THER/PROPH/DIAG IV INF ADDON: CPT

## 2022-02-21 PROCEDURE — 81025 URINE PREGNANCY TEST: CPT | Performed by: PHYSICIAN ASSISTANT

## 2022-02-21 PROCEDURE — 74177 CT ABD & PELVIS W/CONTRAST: CPT

## 2022-02-21 PROCEDURE — 76856 US EXAM PELVIC COMPLETE: CPT

## 2022-02-21 PROCEDURE — 76830 TRANSVAGINAL US NON-OB: CPT

## 2022-02-21 PROCEDURE — 96375 TX/PRO/DX INJ NEW DRUG ADDON: CPT

## 2022-02-21 PROCEDURE — G1004 CDSM NDSC: HCPCS

## 2022-02-21 PROCEDURE — 96365 THER/PROPH/DIAG IV INF INIT: CPT

## 2022-02-21 PROCEDURE — 81001 URINALYSIS AUTO W/SCOPE: CPT | Performed by: PHYSICIAN ASSISTANT

## 2022-02-21 PROCEDURE — 99284 EMERGENCY DEPT VISIT MOD MDM: CPT | Performed by: PHYSICIAN ASSISTANT

## 2022-02-21 PROCEDURE — 99284 EMERGENCY DEPT VISIT MOD MDM: CPT

## 2022-02-21 PROCEDURE — 85025 COMPLETE CBC W/AUTO DIFF WBC: CPT | Performed by: PHYSICIAN ASSISTANT

## 2022-02-21 PROCEDURE — 80053 COMPREHEN METABOLIC PANEL: CPT | Performed by: PHYSICIAN ASSISTANT

## 2022-02-21 RX ORDER — KETOROLAC TROMETHAMINE 30 MG/ML
15 INJECTION, SOLUTION INTRAMUSCULAR; INTRAVENOUS ONCE
Status: COMPLETED | OUTPATIENT
Start: 2022-02-21 | End: 2022-02-21

## 2022-02-21 RX ORDER — MELOXICAM 15 MG/1
15 TABLET ORAL DAILY
Qty: 10 TABLET | Refills: 0 | Status: SHIPPED | OUTPATIENT
Start: 2022-02-21

## 2022-02-21 RX ADMIN — SODIUM CHLORIDE, SODIUM LACTATE, POTASSIUM CHLORIDE, AND CALCIUM CHLORIDE 1000 ML: .6; .31; .03; .02 INJECTION, SOLUTION INTRAVENOUS at 12:05

## 2022-02-21 RX ADMIN — KETOROLAC TROMETHAMINE 15 MG: 30 INJECTION, SOLUTION INTRAMUSCULAR at 12:05

## 2022-02-21 RX ADMIN — IOHEXOL 100 ML: 350 INJECTION, SOLUTION INTRAVENOUS at 13:05

## 2022-02-21 NOTE — DISCHARGE INSTRUCTIONS
Please return to the emergency department for worsening symptoms including chest pain, shortness of breath, dizziness, lightheadedness, fever greater than 103, severe pain, inability to walk, fainting episodes, etc  Please follow-up with your family practice provider as soon as possible  I have sent a medication over to your pharmacy for pain  Please take this medication as prescribed  Please only take this with Tylenol and do not take with any other NSAIDs

## 2022-02-21 NOTE — ED NOTES
Pt  Discharged, verbalized understanding  Ambulated steadily from ER with daughter       Maureen Rey RN  02/21/22 2268

## 2022-02-21 NOTE — ED PROVIDER NOTES
History  Chief Complaint   Patient presents with    Flank Pain     L flank pain that radiates into LLQ abdomen  c/o nausea, only peeing a little at time     This is a 47-year-old female with past medical history significant for nephrolithiasis presenting to the emergency department today for left flank and left lower quadrant pain  This has been ongoing for approximately 3 days  Pain begins in her left flank that the most intense pain is located to her left lower quadrant  She denies any fever or chills  She notes some nausea but denies any diarrhea or constipation  No vomiting  She notes her urine has been slightly decreased but she denies any dysuria or hematuria  She denies any vaginal discharge or vaginal bleeding  Positive surgical history for tubal ligation  The patient denies other complaints at this time  History provided by:  Patient   used: No    Flank Pain  Pain location:  L flank  Pain quality: sharp    Pain radiates to:  LLQ  Pain severity:  Moderate  Onset quality:  Gradual  Duration:  3 days  Timing:  Intermittent  Progression:  Worsening  Chronicity:  New  Context: previous surgery    Relieved by:  None tried  Worsened by:  Nothing  Ineffective treatments:  None tried  Associated symptoms: nausea    Associated symptoms: no anorexia, no chest pain, no chills, no constipation, no cough, no diarrhea, no dysuria, no fatigue, no fever, no hematuria, no melena, no shortness of breath, no vaginal bleeding, no vaginal discharge and no vomiting    Risk factors: not pregnant        Prior to Admission Medications   Prescriptions Last Dose Informant Patient Reported? Taking?    albuterol (PROVENTIL HFA,VENTOLIN HFA) 90 mcg/act inhaler   Yes No   Sig: Inhale 2 puffs every 6 (six) hours as needed for wheezing   dicyclomine (BENTYL) 20 mg tablet   No No   Sig: Take 1 tablet (20 mg total) by mouth 2 (two) times a day as needed (abdominal pain)   lidocaine (LIDODERM) 5 %   No No Sig: Apply 1 patch topically daily Remove & Discard patch within 12 hours or as directed by MD   methocarbamol (ROBAXIN) 500 mg tablet   No No   Sig: Take 1 tablet (500 mg total) by mouth 2 (two) times a day   naproxen (NAPROSYN) 500 mg tablet   No No   Sig: Take 1 tablet (500 mg total) by mouth every 12 (twelve) hours as needed for mild pain or moderate pain   Patient not taking: Reported on 1/18/2021   omeprazole (PriLOSEC) 20 mg delayed release capsule   No No   Sig: Take 1 capsule (20 mg total) by mouth daily   ondansetron (ZOFRAN) 4 mg tablet   No No   Sig: Take 1 tablet (4 mg total) by mouth every 12 (twelve) hours as needed for nausea or vomiting   ondansetron (ZOFRAN-ODT) 4 mg disintegrating tablet   No No   Sig: Take 1 tablet (4 mg total) by mouth every 8 (eight) hours as needed for nausea or vomiting   ondansetron (ZOFRAN-ODT) 4 mg disintegrating tablet   No No   Sig: Take 1 tablet (4 mg total) by mouth every 6 (six) hours as needed for nausea or vomiting   sucralfate (CARAFATE) 1 g tablet   No No   Sig: Take 1 tablet (1 g total) by mouth 4 (four) times a day      Facility-Administered Medications: None       Past Medical History:   Diagnosis Date    Asthma     Colitis        Past Surgical History:   Procedure Laterality Date    ABDOMINAL SURGERY         History reviewed  No pertinent family history  I have reviewed and agree with the history as documented  E-Cigarette/Vaping    E-Cigarette Use Never User      E-Cigarette/Vaping Substances    Nicotine No     THC No     CBD No     Flavoring No     Other No     Unknown No      Social History     Tobacco Use    Smoking status: Never Smoker    Smokeless tobacco: Never Used   Vaping Use    Vaping Use: Never used   Substance Use Topics    Alcohol use: Not Currently    Drug use: No       Review of Systems   Constitutional: Negative for appetite change, chills, fatigue and fever  Eyes: Negative for visual disturbance     Respiratory: Negative for cough, chest tightness, shortness of breath and wheezing  Cardiovascular: Negative for chest pain and palpitations  Gastrointestinal: Positive for abdominal pain and nausea  Negative for abdominal distention, anorexia, constipation, diarrhea, melena and vomiting  Genitourinary: Positive for decreased urine volume and flank pain  Negative for dysuria, hematuria, vaginal bleeding and vaginal discharge  Musculoskeletal: Negative for neck pain and neck stiffness  Skin: Negative for rash and wound  Neurological: Negative for dizziness, seizures, syncope, weakness, light-headedness, numbness and headaches  Psychiatric/Behavioral: Negative for confusion  All other systems reviewed and are negative  Physical Exam  Physical Exam  Vitals and nursing note reviewed  Constitutional:       General: She is not in acute distress  Appearance: Normal appearance  She is normal weight  She is not ill-appearing, toxic-appearing or diaphoretic  HENT:      Head: Normocephalic and atraumatic  Nose: Nose normal  No congestion or rhinorrhea  Mouth/Throat:      Mouth: Mucous membranes are moist       Pharynx: No oropharyngeal exudate or posterior oropharyngeal erythema  Eyes:      General: No scleral icterus  Right eye: No discharge  Left eye: No discharge  Extraocular Movements: Extraocular movements intact  Conjunctiva/sclera: Conjunctivae normal    Cardiovascular:      Rate and Rhythm: Normal rate and regular rhythm  Pulses: Normal pulses  Heart sounds: Normal heart sounds  No murmur heard  No friction rub  No gallop  Comments: 2+ radial pulses bilaterally  Pulmonary:      Effort: Pulmonary effort is normal  No respiratory distress  Breath sounds: Normal breath sounds  No stridor  No wheezing, rhonchi or rales  Comments: Clear to auscultation bilaterally without adventitious breath sounds  Chest:      Chest wall: No tenderness     Abdominal: General: Abdomen is flat  There is no distension  Palpations: Abdomen is soft  Tenderness: There is abdominal tenderness  There is left CVA tenderness  There is no right CVA tenderness, guarding or rebound  Comments: Left lower quadrant tenderness to palpation without any evidence of peritonitis  Negative rebound  Negative Rovsing  Non peritoneal   Musculoskeletal:         General: Normal range of motion  Cervical back: Normal range of motion  No tenderness  Right lower leg: No edema  Left lower leg: No edema  Skin:     General: Skin is warm and dry  Capillary Refill: Capillary refill takes less than 2 seconds  Coloration: Skin is not jaundiced or pale  Neurological:      General: No focal deficit present  Mental Status: She is alert and oriented to person, place, and time  Mental status is at baseline     Psychiatric:         Mood and Affect: Mood normal          Behavior: Behavior normal          Vital Signs  ED Triage Vitals   Temperature Pulse Respirations Blood Pressure SpO2   02/21/22 1100 02/21/22 1100 02/21/22 1100 02/21/22 1100 02/21/22 1100   98 3 °F (36 8 °C) 75 17 139/60 99 %      Temp Source Heart Rate Source Patient Position - Orthostatic VS BP Location FiO2 (%)   02/21/22 1100 02/21/22 1100 02/21/22 1100 02/21/22 1100 --   Oral Monitor Lying Right arm       Pain Score       02/21/22 1325       5           Vitals:    02/21/22 1100 02/21/22 1325   BP: 139/60 136/81   Pulse: 75 67   Patient Position - Orthostatic VS: Lying Lying         Visual Acuity      ED Medications  Medications   ketorolac (TORADOL) injection 15 mg (15 mg Intravenous Given 2/21/22 1205)   lactated ringers bolus 1,000 mL (0 mL Intravenous Stopped 2/21/22 1503)   iohexol (OMNIPAQUE) 350 MG/ML injection (SINGLE-DOSE) 100 mL (100 mL Intravenous Given 2/21/22 1305)       Diagnostic Studies  Results Reviewed     Procedure Component Value Units Date/Time    Urine Microscopic [[id number]] (Abnormal) Collected: 02/21/22 1149    Lab Status: Final result Specimen: Urine, Clean Catch Updated: 02/21/22 1358     RBC, UA 0-1 /hpf      WBC, UA 0-1 /hpf      Epithelial Cells Occasional /hpf      Bacteria, UA Occasional /hpf     Comprehensive metabolic panel [[id number]]  (Abnormal) Collected: 02/21/22 1155    Lab Status: Final result Specimen: Blood from Arm, Right Updated: 02/21/22 1234     Sodium 146 mmol/L      Potassium 4 0 mmol/L      Chloride 109 mmol/L      CO2 28 mmol/L      ANION GAP 9 mmol/L      BUN 17 mg/dL      Creatinine 0 88 mg/dL      Glucose 77 mg/dL      Calcium 8 2 mg/dL      AST 12 U/L      ALT 17 U/L      Alkaline Phosphatase 96 U/L      Total Protein 7 3 g/dL      Albumin 3 6 g/dL      Total Bilirubin 0 39 mg/dL      eGFR 77 ml/min/1 73sq m     Narrative:      [place] guidelines for Chronic Kidney Disease (CKD):     Stage 1 with normal or high GFR (GFR > 90 mL/min/1 73 square meters)    Stage 2 Mild CKD (GFR = 60-89 mL/min/1 73 square meters)    Stage 3A Moderate CKD (GFR = 45-59 mL/min/1 73 square meters)    Stage 3B Moderate CKD (GFR = 30-44 mL/min/1 73 square meters)    Stage 4 Severe CKD (GFR = 15-29 mL/min/1 73 square meters)    Stage 5 End Stage CKD (GFR <15 mL/min/1 73 square meters)  Note: GFR calculation is accurate only with a steady state creatinine    Lipase [[id number]]  (Normal) Collected: 02/21/22 1155    Lab Status: Final result Specimen: Blood from Arm, Right Updated: 02/21/22 1234     Lipase 165 u/L     UA w Reflex to Microscopic w Reflex to Culture [[id number]]  (Abnormal) Collected: 02/21/22 1149    Lab Status: Final result Specimen: Urine, Clean Catch Updated: 02/21/22 1218     Color, UA Yellow     Clarity, UA Clear     Specific [place], UA 1 020     pH, UA 6 0     Leukocytes, UA Negative     Nitrite, UA Negative     Protein, UA Negative mg/dl      Glucose, UA Negative mg/dl      Ketones, UA Negative mg/dl      Urobilinogen, UA 0 2 E U /dl      Bilirubin, UA Negative     Blood, UA Trace-Intact    CBC and differential [[id number]] Collected: 02/21/22 1155    Lab Status: Final result Specimen: Blood from Arm, Right Updated: 02/21/22 1205     WBC 7 42 Thousand/uL      RBC 4 21 Million/uL      Hemoglobin 13 2 g/dL      Hematocrit 41 3 %      MCV 98 fL      MCH 31 4 pg      MCHC 32 0 g/dL      RDW 12 9 %      MPV 10 4 fL      Platelets 211 Thousands/uL      nRBC 0 /100 WBCs      Neutrophils Relative 55 %      Immat GRANS % 0 %      Lymphocytes Relative 31 %      Monocytes Relative 9 %      Eosinophils Relative 5 %      Basophils Relative 0 %      Neutrophils Absolute 4 07 Thousands/µL      Immature Grans Absolute 0 01 Thousand/uL      Lymphocytes Absolute 2 32 Thousands/µL      Monocytes Absolute 0 63 Thousand/µL      Eosinophils Absolute 0 36 Thousand/µL      Basophils Absolute 0 03 Thousands/µL     POCT pregnancy, urine [[id number]]  (Normal) Resulted: 02/21/22 1154    Lab Status: Final result Updated: 02/21/22 1154     EXT PREG TEST UR (Ref: Negative) Negative (-)     Control Valid                 US pelvis complete w transvaginal   Final Result by Mariaelena Gomes MD (02/21 1451)       Normal uterus and ovaries without evidence of current torsion  Workstation performed: [id number]         CT abdomen pelvis with contrast   Final Result by Pablo Lindo MD (02/21 1323)      No acute intra-abdominal abnormality  Workstation performed: [id number]                    Procedures  Procedures         ED Course  ED Course as of 02/21/22 1651   Mon Feb 21, 2022   1341 Patient still noting severe LLQ pain  Will order US to R/O ovarian torsion  Will continue to monitor  1454 No torsion on US  Will plan on DC with strict return precautions                                               MDM  Number of Diagnoses or Management Options  Left lower quadrant pain: new and requires workup  Diagnosis management comments: This is a 53-year-old female presenting to the emergency department today for left-sided flank and left lower quadrant pain ongoing for 3 days  Associated with nausea and decreased urine  Prior history of nephrolithiasis  Vital signs are stable  On physical exam, the patient has left CVA tenderness in addition to left lower quadrant pain  She is non peritoneal   Normal cardiac and pulmonary examinations  Labs are reassuring  No urinary tract infection  CT abdomen and pelvis negative for any acute intra-abdominal pathologies  No bilateral nephrolithiasis  No evidence of ovarian torsion on ultrasonography  The patient is stable for discharge at this time  Mobic sent to the patient's pharmacy  Told her not to take this with any additional NSAIDs  Also recommend Tylenol for symptomatic relief  Recommended patient follow-up with her PCP as soon as possible  Strict return precautions were given  The patient verifies understanding and agrees to the plan at this time  Discharge instructions discussed in [place] and in [place]  All questions answered to the patient's satisfaction  Amount and/or Complexity of Data Reviewed  Clinical lab tests: ordered and reviewed  Tests in the radiology section of CPT®: ordered and reviewed  Obtain history from someone other than the patient: yes    Patient Progress  Patient progress: stable       Disposition  Final diagnoses:   Left lower quadrant pain     Time reflects when diagnosis was documented in both MDM as applicable and the Disposition within this note     Time User Action Codes Description Comment    2/21/2022  2:56 PM Ralph Grover Add [R10 32] Left lower quadrant pain       ED Disposition     ED Disposition Condition Date/Time Comment    Discharge Stable Mon Feb 21, 2022  2:56 PM [address] discharge to home/self care              Follow-up Information     Follow up With Specialties Details Why Contact Info Cleo Cohen Kylie Cui MD Family Medicine Schedule an appointment as soon as possible for a visit   [address] [address]  [address] Emergency Department Emergency Medicine Go to  If symptoms worsen [facility] [id number] [address] [facility] Emergency Department, [address]  , [place], [facility], [address]          Discharge Medication List as of 2/21/2022  2:57 PM      START taking these medications    Details   meloxicam (Mobic) 15 mg tablet Take 1 tablet (15 mg total) by mouth daily, Starting Mon 2/21/2022, Normal         CONTINUE these medications which have NOT CHANGED    Details   albuterol (PROVENTIL HFA,VENTOLIN HFA) 90 mcg/act inhaler Inhale 2 puffs every 6 (six) hours as needed for wheezing, Historical Med      dicyclomine (BENTYL) 20 mg tablet Take 1 tablet (20 mg total) by mouth 2 (two) times a day as needed (abdominal pain), Starting Mon 1/18/2021, Normal      lidocaine (LIDODERM) 5 % Apply 1 patch topically daily Remove & Discard patch within 12 hours or as directed by MD, Starting Fri 5/14/2021, Normal      methocarbamol (ROBAXIN) 500 mg tablet Take 1 tablet (500 mg total) by mouth 2 (two) times a day, Starting Fri 5/14/2021, Normal      naproxen (NAPROSYN) 500 mg tablet Take 1 tablet (500 mg total) by mouth every 12 (twelve) hours as needed for mild pain or moderate pain, Starting Fri 2/28/2020, Print      omeprazole (PriLOSEC) 20 mg delayed release capsule Take 1 capsule (20 mg total) by mouth daily, Starting Fri 7/30/2021, Print      ondansetron (ZOFRAN) 4 mg tablet Take 1 tablet (4 mg total) by mouth every 12 (twelve) hours as needed for nausea or vomiting, Starting Fri 7/30/2021, Print      !! ondansetron (ZOFRAN-ODT) 4 mg disintegrating tablet Take 1 tablet (4 mg total) by mouth every 8 (eight) hours as needed for nausea or vomiting, Starting Fri 2/28/2020, Print      !! ondansetron (ZOFRAN-ODT) 4 mg disintegrating tablet Take 1 tablet (4 mg total) by mouth every 6 (six) hours as needed for nausea or vomiting, Starting Mon 1/18/2021, Normal      sucralfate (CARAFATE) 1 g tablet Take 1 tablet (1 g total) by mouth 4 (four) times a day, Starting Fri 7/30/2021, Print       !! - Potential duplicate medications found  Please discuss with provider  No discharge procedures on file      PDMP Review     None          ED Provider  Electronically Signed by           Kristal Petty PA-C  02/21/22 0777

## 2022-03-08 ENCOUNTER — APPOINTMENT (EMERGENCY)
Dept: CT IMAGING | Facility: HOSPITAL | Age: 52
End: 2022-03-08
Payer: COMMERCIAL

## 2022-03-08 ENCOUNTER — APPOINTMENT (EMERGENCY)
Dept: RADIOLOGY | Facility: HOSPITAL | Age: 52
End: 2022-03-08
Payer: COMMERCIAL

## 2022-03-08 ENCOUNTER — HOSPITAL ENCOUNTER (EMERGENCY)
Facility: HOSPITAL | Age: 52
Discharge: HOME | End: 2022-03-08
Attending: EMERGENCY MEDICINE
Payer: COMMERCIAL

## 2022-03-08 VITALS
DIASTOLIC BLOOD PRESSURE: 80 MMHG | HEART RATE: 70 BPM | WEIGHT: 146.39 LBS | SYSTOLIC BLOOD PRESSURE: 120 MMHG | RESPIRATION RATE: 16 BRPM | OXYGEN SATURATION: 98 % | TEMPERATURE: 98.4 F

## 2022-03-08 DIAGNOSIS — R31.9 HEMATURIA: Primary | ICD-10-CM

## 2022-03-08 DIAGNOSIS — R11.0 NAUSEA: ICD-10-CM

## 2022-03-08 DIAGNOSIS — R07.89 CHEST WALL PAIN: ICD-10-CM

## 2022-03-08 LAB
ALBUMIN SERPL BCP-MCNC: 3.8 G/DL (ref 3.5–5)
ALP SERPL-CCNC: 98 U/L (ref 46–116)
ALT SERPL W P-5'-P-CCNC: 20 U/L (ref 12–78)
AMORPH URATE CRY URNS QL MICRO: ABNORMAL /HPF
ANION GAP SERPL CALCULATED.3IONS-SCNC: 10 MMOL/L (ref 4–13)
AST SERPL W P-5'-P-CCNC: 20 U/L (ref 5–45)
ATRIAL RATE: 71 BPM
BACTERIA UR QL AUTO: ABNORMAL /HPF
BASOPHILS # BLD AUTO: 0.02 THOUSANDS/ΜL (ref 0–0.1)
BASOPHILS NFR BLD AUTO: 0 % (ref 0–1)
BILIRUB DIRECT SERPL-MCNC: 0.1 MG/DL (ref 0–0.2)
BILIRUB SERPL-MCNC: 0.55 MG/DL (ref 0.2–1)
BILIRUB UR QL STRIP: NEGATIVE
BUN SERPL-MCNC: 13 MG/DL (ref 5–25)
CALCIUM SERPL-MCNC: 8.2 MG/DL (ref 8.3–10.1)
CARDIAC TROPONIN I PNL SERPL HS: 3 NG/L
CHLORIDE SERPL-SCNC: 103 MMOL/L (ref 100–108)
CLARITY UR: ABNORMAL
CO2 SERPL-SCNC: 24 MMOL/L (ref 21–32)
COLOR UR: YELLOW
CREAT SERPL-MCNC: 0.95 MG/DL (ref 0.6–1.3)
EOSINOPHIL # BLD AUTO: 0.02 THOUSAND/ΜL (ref 0–0.61)
EOSINOPHIL NFR BLD AUTO: 0 % (ref 0–6)
ERYTHROCYTE [DISTWIDTH] IN BLOOD BY AUTOMATED COUNT: 13 % (ref 11.6–15.1)
EXT PREG TEST URINE: NEGATIVE
EXT. CONTROL ED NAV: NORMAL
GFR SERPL CREATININE-BSD FRML MDRD: 70 ML/MIN/1.73SQ M
GLUCOSE SERPL-MCNC: 109 MG/DL (ref 65–140)
GLUCOSE UR STRIP-MCNC: NEGATIVE MG/DL
HCT VFR BLD AUTO: 41.2 % (ref 34.8–46.1)
HGB BLD-MCNC: 13.9 G/DL (ref 11.5–15.4)
HGB UR QL STRIP.AUTO: ABNORMAL
IMM GRANULOCYTES # BLD AUTO: 0.01 THOUSAND/UL (ref 0–0.2)
IMM GRANULOCYTES NFR BLD AUTO: 0 % (ref 0–2)
KETONES UR STRIP-MCNC: NEGATIVE MG/DL
LEUKOCYTE ESTERASE UR QL STRIP: NEGATIVE
LIPASE SERPL-CCNC: 98 U/L (ref 73–393)
LYMPHOCYTES # BLD AUTO: 0.44 THOUSANDS/ΜL (ref 0.6–4.47)
LYMPHOCYTES NFR BLD AUTO: 7 % (ref 14–44)
MCH RBC QN AUTO: 31.8 PG (ref 26.8–34.3)
MCHC RBC AUTO-ENTMCNC: 33.7 G/DL (ref 31.4–37.4)
MCV RBC AUTO: 94 FL (ref 82–98)
MONOCYTES # BLD AUTO: 0.6 THOUSAND/ΜL (ref 0.17–1.22)
MONOCYTES NFR BLD AUTO: 10 % (ref 4–12)
NEUTROPHILS # BLD AUTO: 5.13 THOUSANDS/ΜL (ref 1.85–7.62)
NEUTS SEG NFR BLD AUTO: 83 % (ref 43–75)
NITRITE UR QL STRIP: NEGATIVE
NON-SQ EPI CELLS URNS QL MICRO: ABNORMAL /HPF
NRBC BLD AUTO-RTO: 0 /100 WBCS
P AXIS: 25 DEGREES
PH UR STRIP.AUTO: 6 [PH]
PLATELET # BLD AUTO: 269 THOUSANDS/UL (ref 149–390)
PMV BLD AUTO: 10.4 FL (ref 8.9–12.7)
POTASSIUM SERPL-SCNC: 4.1 MMOL/L (ref 3.5–5.3)
PR INTERVAL: 128 MS
PROT SERPL-MCNC: 8.1 G/DL (ref 6.4–8.2)
PROT UR STRIP-MCNC: ABNORMAL MG/DL
QRS AXIS: 47 DEGREES
QRSD INTERVAL: 80 MS
QT INTERVAL: 378 MS
QTC INTERVAL: 410 MS
RBC # BLD AUTO: 4.37 MILLION/UL (ref 3.81–5.12)
RBC #/AREA URNS AUTO: ABNORMAL /HPF
SODIUM SERPL-SCNC: 137 MMOL/L (ref 136–145)
SP GR UR STRIP.AUTO: >=1.03 (ref 1–1.03)
T WAVE AXIS: 26 DEGREES
UROBILINOGEN UR QL STRIP.AUTO: 0.2 E.U./DL
VENTRICULAR RATE: 71 BPM
WBC # BLD AUTO: 6.22 THOUSAND/UL (ref 4.31–10.16)
WBC #/AREA URNS AUTO: ABNORMAL /HPF

## 2022-03-08 PROCEDURE — 71046 X-RAY EXAM CHEST 2 VIEWS: CPT

## 2022-03-08 PROCEDURE — 83690 ASSAY OF LIPASE: CPT | Performed by: EMERGENCY MEDICINE

## 2022-03-08 PROCEDURE — G1004 CDSM NDSC: HCPCS

## 2022-03-08 PROCEDURE — 81025 URINE PREGNANCY TEST: CPT | Performed by: EMERGENCY MEDICINE

## 2022-03-08 PROCEDURE — 93010 ELECTROCARDIOGRAM REPORT: CPT | Performed by: INTERNAL MEDICINE

## 2022-03-08 PROCEDURE — 93005 ELECTROCARDIOGRAM TRACING: CPT

## 2022-03-08 PROCEDURE — 80076 HEPATIC FUNCTION PANEL: CPT | Performed by: EMERGENCY MEDICINE

## 2022-03-08 PROCEDURE — 99285 EMERGENCY DEPT VISIT HI MDM: CPT | Performed by: EMERGENCY MEDICINE

## 2022-03-08 PROCEDURE — 96374 THER/PROPH/DIAG INJ IV PUSH: CPT

## 2022-03-08 PROCEDURE — 96361 HYDRATE IV INFUSION ADD-ON: CPT

## 2022-03-08 PROCEDURE — 74177 CT ABD & PELVIS W/CONTRAST: CPT

## 2022-03-08 PROCEDURE — 99285 EMERGENCY DEPT VISIT HI MDM: CPT

## 2022-03-08 PROCEDURE — 85025 COMPLETE CBC W/AUTO DIFF WBC: CPT | Performed by: EMERGENCY MEDICINE

## 2022-03-08 PROCEDURE — 81001 URINALYSIS AUTO W/SCOPE: CPT | Performed by: EMERGENCY MEDICINE

## 2022-03-08 PROCEDURE — 84484 ASSAY OF TROPONIN QUANT: CPT | Performed by: EMERGENCY MEDICINE

## 2022-03-08 PROCEDURE — 36415 COLL VENOUS BLD VENIPUNCTURE: CPT | Performed by: EMERGENCY MEDICINE

## 2022-03-08 PROCEDURE — 80048 BASIC METABOLIC PNL TOTAL CA: CPT | Performed by: EMERGENCY MEDICINE

## 2022-03-08 RX ORDER — ONDANSETRON 4 MG/1
4 TABLET, ORALLY DISINTEGRATING ORAL EVERY 8 HOURS PRN
Qty: 12 TABLET | Refills: 0 | Status: SHIPPED | OUTPATIENT
Start: 2022-03-08

## 2022-03-08 RX ORDER — ONDANSETRON 2 MG/ML
4 INJECTION INTRAMUSCULAR; INTRAVENOUS ONCE
Status: COMPLETED | OUTPATIENT
Start: 2022-03-08 | End: 2022-03-08

## 2022-03-08 RX ADMIN — IOHEXOL 100 ML: 350 INJECTION, SOLUTION INTRAVENOUS at 13:34

## 2022-03-08 RX ADMIN — SODIUM CHLORIDE 1000 ML: 0.9 INJECTION, SOLUTION INTRAVENOUS at 12:20

## 2022-03-08 RX ADMIN — ONDANSETRON 4 MG: 2 INJECTION INTRAMUSCULAR; INTRAVENOUS at 13:14

## 2022-03-08 NOTE — ED PROVIDER NOTES
History  Chief Complaint   Patient presents with    Abdominal Pain     pt c/o LLQ abd pain that radiates to both flank areas  pt c/o n/v but denies diarrhea  pt has a Hx of kidney stones  pt deneis cp/sob/fevers     Patient with left lower quadrant abdominal pain  Some radiation to the back  Also to the left shoulder  The patient states that she has been having urinary frequency with a small amount of urine passing each time  She had urinalysis performed at local office and she was told it was contaminated   Subjective fevers  Has not taken her temperature  She does have nausea and some vomiting today  Abdominal pain started yesterday  The patient has had previous laparoscopy but she is unsure why she had the surgery or if she had any organs repaired or removed  She states this was many years ago  She states she is not passing gas  Symptoms are moderate severity  Some relief with ibuprofen earlier today  No aggravating or alleviating factors  No other associated symptoms other than those noted  Differential diagnosis includes bowel obstruction, diverticulitis, constipation, UTI, pyelonephritis  Prior to Admission Medications   Prescriptions Last Dose Informant Patient Reported? Taking?    albuterol (PROVENTIL HFA,VENTOLIN HFA) 90 mcg/act inhaler   Yes No   Sig: Inhale 2 puffs every 6 (six) hours as needed for wheezing   dicyclomine (BENTYL) 20 mg tablet   No No   Sig: Take 1 tablet (20 mg total) by mouth 2 (two) times a day as needed (abdominal pain)   lidocaine (LIDODERM) 5 %   No No   Sig: Apply 1 patch topically daily Remove & Discard patch within 12 hours or as directed by MD   meloxicam (Mobic) 15 mg tablet   No No   Sig: Take 1 tablet (15 mg total) by mouth daily   methocarbamol (ROBAXIN) 500 mg tablet   No No   Sig: Take 1 tablet (500 mg total) by mouth 2 (two) times a day   naproxen (NAPROSYN) 500 mg tablet   No No   Sig: Take 1 tablet (500 mg total) by mouth every 12 (twelve) hours as needed for mild pain or moderate pain   Patient not taking: Reported on 1/18/2021   omeprazole (PriLOSEC) 20 mg delayed release capsule   No No   Sig: Take 1 capsule (20 mg total) by mouth daily   sucralfate (CARAFATE) 1 g tablet   No No   Sig: Take 1 tablet (1 g total) by mouth 4 (four) times a day      Facility-Administered Medications: None       Past Medical History:   Diagnosis Date    Asthma     Colitis        Past Surgical History:   Procedure Laterality Date    ABDOMINAL SURGERY         History reviewed  No pertinent family history  I have reviewed and agree with the history as documented  E-Cigarette/Vaping    E-Cigarette Use Never User      E-Cigarette/Vaping Substances    Nicotine No     THC No     CBD No     Flavoring No     Other No     Unknown No      Social History     Tobacco Use    Smoking status: Never Smoker    Smokeless tobacco: Never Used   Vaping Use    Vaping Use: Never used   Substance Use Topics    Alcohol use: Not Currently    Drug use: No       Review of Systems   Constitutional: Negative for activity change, appetite change and fever  HENT: Negative for congestion, ear pain, rhinorrhea and sore throat  Eyes: Negative for pain and redness  Respiratory: Negative for cough, shortness of breath and wheezing  Cardiovascular: Negative for chest pain and palpitations  Gastrointestinal: Positive for abdominal pain, nausea and vomiting  Negative for diarrhea  Endocrine: Negative for polyuria  Genitourinary: Positive for dysuria and frequency  Negative for difficulty urinating and urgency  Musculoskeletal: Negative for arthralgias and myalgias  Skin: Negative for color change and rash  Allergic/Immunologic: Negative for immunocompromised state  Neurological: Negative for dizziness, syncope and light-headedness  Hematological: Does not bruise/bleed easily  Psychiatric/Behavioral: Negative for confusion     All other systems reviewed and are negative  Physical Exam  Physical Exam  Vitals and nursing note reviewed  Constitutional:       General: She is not in acute distress  Appearance: She is well-developed  HENT:      Head: Normocephalic and atraumatic  Nose: Nose normal    Eyes:      General: No scleral icterus  Conjunctiva/sclera: Conjunctivae normal    Cardiovascular:      Rate and Rhythm: Normal rate and regular rhythm  Heart sounds: Normal heart sounds  Pulmonary:      Effort: Pulmonary effort is normal  No respiratory distress  Breath sounds: Normal breath sounds  No stridor  No wheezing  Abdominal:      General: Bowel sounds are normal  There is no distension  Palpations: Abdomen is soft  Tenderness: There is generalized abdominal tenderness  There is no guarding or rebound  Musculoskeletal:         General: No deformity  Cervical back: Normal range of motion and neck supple  Skin:     General: Skin is warm and dry  Findings: No rash  Neurological:      Mental Status: She is alert and oriented to person, place, and time  Psychiatric:         Thought Content:  Thought content normal          Vital Signs  ED Triage Vitals [03/08/22 1124]   Temperature Pulse Respirations Blood Pressure SpO2   98 4 °F (36 9 °C) (!) 114 18 128/76 98 %      Temp Source Heart Rate Source Patient Position - Orthostatic VS BP Location FiO2 (%)   Oral Monitor Sitting Right arm --      Pain Score       8           Vitals:    03/08/22 1124 03/08/22 1251 03/08/22 1534 03/08/22 1638   BP: 128/76 122/80 127/84 120/80   Pulse: (!) 114 82 67 70   Patient Position - Orthostatic VS: Sitting Lying Lying          Visual Acuity      ED Medications  Medications   sodium chloride 0 9 % bolus 1,000 mL (0 mL Intravenous Stopped 3/8/22 1340)   ondansetron (ZOFRAN) injection 4 mg (4 mg Intravenous Given 3/8/22 1314)   iohexol (OMNIPAQUE) 350 MG/ML injection (SINGLE-DOSE) 100 mL (100 mL Intravenous Given 3/8/22 1334) Diagnostic Studies  Results Reviewed     Procedure Component Value Units Date/Time    HS Troponin 0hr (reflex protocol) [[id number]]  (Normal) Collected: 03/08/22 1213    Lab Status: Final result Specimen: Blood from Arm, Right Updated: 03/08/22 1551     hs TnI 0hr 3 ng/L     Urine Microscopic [[id number]]  (Abnormal) Collected: 03/08/22 1211    Lab Status: Final result Specimen: Urine, Clean Catch Updated: 03/08/22 1315     RBC, UA None Seen /hpf      WBC, UA None Seen /hpf      Epithelial Cells Occasional /hpf      Bacteria, UA       Field obscured, unable to enumerate     /hpf     AMORPH URATES Innumerable /hpf     UA (URINE) with reflex to Scope [[id number]]  (Abnormal) Collected: 03/08/22 1211    Lab Status: Final result Specimen: Urine, Clean Catch Updated: 03/08/22 1302     Color, UA Yellow     Clarity, UA Cloudy     Specific Gravity, UA >=1 030     pH, UA 6 0     Leukocytes, UA Negative     Nitrite, UA Negative     Protein, UA 30 (1+) mg/dl      Glucose, UA Negative mg/dl      Ketones, UA Negative mg/dl      Urobilinogen, UA 0 2 E U /dl      Bilirubin, UA Negative     Blood, UA Large    Hepatic function panel [[id number]]  (Normal) Collected: 03/08/22 1213    Lab Status: Final result Specimen: Blood from Arm, Right Updated: 03/08/22 1242     Total Bilirubin 0 55 mg/dL      Bilirubin, Direct 0 10 mg/dL      Alkaline Phosphatase 98 U/L      AST 20 U/L      ALT 20 U/L      Total Protein 8 1 g/dL      Albumin 3 8 g/dL     Lipase [[id number]]  (Normal) Collected: 03/08/22 1213    Lab Status: Final result Specimen: Blood from Arm, Right Updated: 03/08/22 1242     Lipase 98 u/L     Basic metabolic panel [[id number]]  (Abnormal) Collected: 03/08/22 1213    Lab Status: Final result Specimen: Blood from Arm, Right Updated: 03/08/22 1242     Sodium 137 mmol/L      Potassium 4 1 mmol/L      Chloride 103 mmol/L      CO2 24 mmol/L      ANION GAP 10 mmol/L      BUN 13 mg/dL      Creatinine 0 95 mg/dL      Glucose 109 mg/dL Calcium 8 2 mg/dL      eGFR 70 ml/min/1 73sq m     Narrative:      National Kid[employer] Disease Foundation guidelines for Chronic Kidney Disease (CKD):     Stage 1 with normal or high GFR (GFR > 90 mL/min/1 73 square meters)    Stage 2 Mild CKD (GFR = 60-89 mL/min/1 73 square meters)    Stage 3A Moderate CKD (GFR = 45-59 mL/min/1 73 square meters)    Stage 3B Moderate CKD (GFR = 30-44 mL/min/1 73 square meters)    Stage 4 Severe CKD (GFR = 15-29 mL/min/1 73 square meters)    Stage 5 End Stage CKD (GFR <15 mL/min/1 73 square meters)  Note: GFR calculation is accurate only with a steady state creatinine    POCT pregnancy, urine [[id number]]  (Normal) Resulted: 03/08/22 1240    Lab Status: Final result Updated: 03/08/22 1240     EXT PREG TEST UR (Ref: Negative) NEGATIVE     Control VALID    CBC and differential [[id number]]  (Abnormal) Collected: 03/08/22 1213    Lab Status: Final result Specimen: Blood from Arm, Right Updated: 03/08/22 1218     WBC 6 22 Thousand/uL      RBC 4 37 Million/uL      Hemoglobin 13 9 g/dL      Hematocrit 41 2 %      MCV 94 fL      MCH 31 8 pg      MCHC 33 7 g/dL      RDW 13 0 %      MPV 10 4 fL      Platelets 384 Thousands/uL      nRBC 0 /100 WBCs      Neutrophils Relative 83 %      Immat GRANS % 0 %      Lymphocytes Relative 7 %      Monocytes Relative 10 %      Eosinophils Relative 0 %      Basophils Relative 0 %      Neutrophils Absolute 5 13 Thousands/µL      Immature Grans Absolute 0 01 Thousand/uL      Lymphocytes Absolute 0 44 Thousands/µL      Monocytes Absolute 0 60 Thousand/µL      Eosinophils Absolute 0 02 Thousand/µL      Basophils Absolute 0 02 Thousands/µL                  XR chest 2 views   ED Interpretation by Omar Chowdary MD (03/08 0568)   No infiltrate,       Final Result by Shannan Rain DO (03/08 1706)   No acute cardiopulmonary disease                    Workstation performed: [id number]         CT abdomen pelvis with contrast   Final Result by Kranthi Kincaid MD (03/08 1419)      No acute inflammatory process identified  Workstation performed: [id number]                    Procedures  ECG 12 Lead Documentation Only    Date/Time: 3/8/2022 3:58 PM  Performed by: Enedina Wesley MD  Authorized by: Enedina Wesley MD     Indications / Diagnosis:  Chest wall pain  ECG reviewed by me, the ED Provider: yes    Patient location:  ED  Rate:     ECG rate assessment: normal    Rhythm:     Rhythm: sinus rhythm    Ectopy:     Ectopy: none    QRS:     QRS axis:  Normal    QRS intervals:  Normal  Conduction:     Conduction: normal    ST segments:     ST segments:  Normal  T waves:     T waves: inverted      Inverted:  III (no change from 7/30/21)             ED Course  ED Course as of 03/17/22 1938   Tue Mar 08, 2022   1504 Went to discuss lab results with patient  She asked about the chest pain that she has been having  She had not initially disclose this to me and previously had said that she had had some back pain and abdominal pain  She states that she has had 2 days worth of chest pain  It is worse when you touch it  Over the left anterior chest wall  No rash  Denies any trauma  No pain with these inspiration  No pain with exertion  No radiation  No diaphoresis  Will add troponin to original blood in lab  Will check EKG and chest x-ray  Chest pain is reproducible with palpation over the left anterior chest wall  Not reproducible with deep inspiration  Patient denies any history of blood clots, no calf tenderness  No significant leg swelling  1554 Symptoms x 2 days  Troponin is less than 4  Heart score is 2  No delta troponin is needed               HEART Risk Score      Most Recent Value   Heart Score Risk Calculator    History 0 Filed at: 03/08/2022 1555   ECG 1 Filed at: 03/08/2022 1555   Age 1 Filed at: 03/08/2022 1555   Risk Factors 0 Filed at: 03/08/2022 1555   Troponin 0 Filed at: 03/08/2022 1555   HEART Score 2 Filed at: 03/08/2022 1555                        SBIRT 20yo+      Most Recent Value   SBIRT (23 yo +)    In order to provide better care to our patients, we are screening all of our patients for alcohol and drug use  Would it be okay to ask you these screening questions? No Filed at: 03/08/2022 1400                    MDM    Disposition  Final diagnoses:   Hematuria   Chest wall pain   Nausea     Time reflects when diagnosis was documented in both MDM as applicable and the Disposition within this note     Time User Action Codes Description Comment    3/8/2022  4:17 PM Boo Caesar Add [R31 9] Hematuria     3/8/2022  4:18 PM [place] Caesar Add [R07 89] Chest wall pain     3/8/2022  4:18 PM Boo Caesar Add [R11 0] Nausea       ED Disposition     ED Disposition Condition Date/Time Comment    Discharge Stable Tue Mar 8, 2022  4:17 PM [address] discharge to home/self care  Follow-up Information     Follow up With Specialties Details Why Contact Info Additional Information    Khadra Gresham MD Family Medicine In 1 week  [address] [address]  [address] [place] [address],[address] For Urology [facility] In 2 weeks blood and crystals in urine   [facility] [id number]  [address] [place] [address] For Urology [address], [address] [phone]          Discharge Medication List as of 3/8/2022  4:19 PM      START taking these medications    Details   ondansetron (ZOFRAN-ODT) 4 mg disintegrating tablet Take 1 tablet (4 mg total) by mouth every 8 (eight) hours as needed for nausea or vomiting for up to 12 doses, Starting Tue 3/8/2022, Normal         CONTINUE these medications which have NOT CHANGED    Details   albuterol (PROVENTIL HFA,VENTOLIN HFA) 90 mcg/act inhaler Inhale 2 puffs every 6 (six) hours as needed for wheezing, Historical Med      dicyclomine (BENTYL) 20 mg tablet Take 1 tablet (20 mg total) by mouth 2 (two) times a day as needed (abdominal pain), Starting Mon 1/18/2021, Normal      lidocaine (LIDODERM) 5 % Apply 1 patch topically daily Remove & Discard patch within 12 hours or as directed by MD, Starting Fri 5/14/2021, Normal      meloxicam (Mobic) 15 mg tablet Take 1 tablet (15 mg total) by mouth daily, Starting Mon 2/21/2022, Normal      methocarbamol (ROBAXIN) 500 mg tablet Take 1 tablet (500 mg total) by mouth 2 (two) times a day, Starting Fri 5/14/2021, Normal      naproxen (NAPROSYN) 500 mg tablet Take 1 tablet (500 mg total) by mouth every 12 (twelve) hours as needed for mild pain or moderate pain, Starting Fri 2/28/2020, Print      omeprazole (PriLOSEC) 20 mg delayed release capsule Take 1 capsule (20 mg total) by mouth daily, Starting Fri 7/30/2021, Print      sucralfate (CARAFATE) 1 g tablet Take 1 tablet (1 g total) by mouth 4 (four) times a day, Starting Fri 7/30/2021, Print             No discharge procedures on file      PDMP Review     None          ED Provider  Electronically Signed by           Heather Cortez MD  03/17/22 3281

## 2022-05-23 ENCOUNTER — HOSPITAL ENCOUNTER (EMERGENCY)
Facility: HOSPITAL | Age: 52
Discharge: HOME | End: 2022-05-23
Attending: EMERGENCY MEDICINE
Payer: COMMERCIAL

## 2022-05-23 ENCOUNTER — APPOINTMENT (EMERGENCY)
Dept: RADIOLOGY | Facility: HOSPITAL | Age: 52
End: 2022-05-23
Payer: COMMERCIAL

## 2022-05-23 VITALS
TEMPERATURE: 97.8 F | HEART RATE: 62 BPM | OXYGEN SATURATION: 99 % | RESPIRATION RATE: 20 BRPM | WEIGHT: 148.15 LBS | SYSTOLIC BLOOD PRESSURE: 138 MMHG | DIASTOLIC BLOOD PRESSURE: 83 MMHG

## 2022-05-23 DIAGNOSIS — R07.89 ATYPICAL CHEST PAIN: Primary | ICD-10-CM

## 2022-05-23 LAB
ANION GAP SERPL CALCULATED.3IONS-SCNC: 8 MMOL/L (ref 4–13)
ATRIAL RATE: 71 BPM
BASOPHILS # BLD AUTO: 0.03 THOUSANDS/ΜL (ref 0–0.1)
BASOPHILS NFR BLD AUTO: 1 % (ref 0–1)
BUN SERPL-MCNC: 20 MG/DL (ref 5–25)
CALCIUM SERPL-MCNC: 9.3 MG/DL (ref 8.3–10.1)
CARDIAC TROPONIN I PNL SERPL HS: <2 NG/L
CHLORIDE SERPL-SCNC: 105 MMOL/L (ref 100–108)
CO2 SERPL-SCNC: 26 MMOL/L (ref 21–32)
CREAT SERPL-MCNC: 0.94 MG/DL (ref 0.6–1.3)
EOSINOPHIL # BLD AUTO: 0.24 THOUSAND/ΜL (ref 0–0.61)
EOSINOPHIL NFR BLD AUTO: 4 % (ref 0–6)
ERYTHROCYTE [DISTWIDTH] IN BLOOD BY AUTOMATED COUNT: 12.3 % (ref 11.6–15.1)
FLUAV RNA RESP QL NAA+PROBE: NEGATIVE
FLUBV RNA RESP QL NAA+PROBE: NEGATIVE
GFR SERPL CREATININE-BSD FRML MDRD: 71 ML/MIN/1.73SQ M
GLUCOSE SERPL-MCNC: 89 MG/DL (ref 65–140)
HCT VFR BLD AUTO: 44.3 % (ref 34.8–46.1)
HGB BLD-MCNC: 14.6 G/DL (ref 11.5–15.4)
IMM GRANULOCYTES # BLD AUTO: 0.01 THOUSAND/UL (ref 0–0.2)
IMM GRANULOCYTES NFR BLD AUTO: 0 % (ref 0–2)
LYMPHOCYTES # BLD AUTO: 2.08 THOUSANDS/ΜL (ref 0.6–4.47)
LYMPHOCYTES NFR BLD AUTO: 33 % (ref 14–44)
MCH RBC QN AUTO: 31.9 PG (ref 26.8–34.3)
MCHC RBC AUTO-ENTMCNC: 33 G/DL (ref 31.4–37.4)
MCV RBC AUTO: 97 FL (ref 82–98)
MONOCYTES # BLD AUTO: 0.49 THOUSAND/ΜL (ref 0.17–1.22)
MONOCYTES NFR BLD AUTO: 8 % (ref 4–12)
NEUTROPHILS # BLD AUTO: 3.51 THOUSANDS/ΜL (ref 1.85–7.62)
NEUTS SEG NFR BLD AUTO: 54 % (ref 43–75)
NRBC BLD AUTO-RTO: 0 /100 WBCS
P AXIS: 42 DEGREES
PLATELET # BLD AUTO: 298 THOUSANDS/UL (ref 149–390)
PMV BLD AUTO: 10.8 FL (ref 8.9–12.7)
POTASSIUM SERPL-SCNC: 3.7 MMOL/L (ref 3.5–5.3)
PR INTERVAL: 132 MS
QRS AXIS: 46 DEGREES
QRSD INTERVAL: 88 MS
QT INTERVAL: 380 MS
QTC INTERVAL: 412 MS
RBC # BLD AUTO: 4.57 MILLION/UL (ref 3.81–5.12)
RSV RNA RESP QL NAA+PROBE: NEGATIVE
SARS-COV-2 RNA RESP QL NAA+PROBE: NEGATIVE
SODIUM SERPL-SCNC: 139 MMOL/L (ref 136–145)
T WAVE AXIS: 27 DEGREES
TSH SERPL DL<=0.05 MIU/L-ACNC: 1.71 UIU/ML (ref 0.45–4.5)
VENTRICULAR RATE: 71 BPM
WBC # BLD AUTO: 6.36 THOUSAND/UL (ref 4.31–10.16)

## 2022-05-23 PROCEDURE — 0241U HB NFCT DS VIR RESP RNA 4 TRGT: CPT

## 2022-05-23 PROCEDURE — 93010 ELECTROCARDIOGRAM REPORT: CPT

## 2022-05-23 PROCEDURE — 96361 HYDRATE IV INFUSION ADD-ON: CPT

## 2022-05-23 PROCEDURE — 80048 BASIC METABOLIC PNL TOTAL CA: CPT

## 2022-05-23 PROCEDURE — 96375 TX/PRO/DX INJ NEW DRUG ADDON: CPT

## 2022-05-23 PROCEDURE — 93005 ELECTROCARDIOGRAM TRACING: CPT

## 2022-05-23 PROCEDURE — 99285 EMERGENCY DEPT VISIT HI MDM: CPT

## 2022-05-23 PROCEDURE — 84443 ASSAY THYROID STIM HORMONE: CPT

## 2022-05-23 PROCEDURE — 36415 COLL VENOUS BLD VENIPUNCTURE: CPT

## 2022-05-23 PROCEDURE — 71045 X-RAY EXAM CHEST 1 VIEW: CPT

## 2022-05-23 PROCEDURE — 85025 COMPLETE CBC W/AUTO DIFF WBC: CPT

## 2022-05-23 PROCEDURE — 84484 ASSAY OF TROPONIN QUANT: CPT

## 2022-05-23 PROCEDURE — 96374 THER/PROPH/DIAG INJ IV PUSH: CPT

## 2022-05-23 RX ORDER — METOCLOPRAMIDE HYDROCHLORIDE 5 MG/ML
10 INJECTION INTRAMUSCULAR; INTRAVENOUS ONCE
Status: COMPLETED | OUTPATIENT
Start: 2022-05-23 | End: 2022-05-23

## 2022-05-23 RX ORDER — DIPHENHYDRAMINE HCL 25 MG
25 TABLET ORAL ONCE
Status: COMPLETED | OUTPATIENT
Start: 2022-05-23 | End: 2022-05-23

## 2022-05-23 RX ORDER — KETOROLAC TROMETHAMINE 30 MG/ML
15 INJECTION, SOLUTION INTRAMUSCULAR; INTRAVENOUS ONCE
Status: COMPLETED | OUTPATIENT
Start: 2022-05-23 | End: 2022-05-23

## 2022-05-23 RX ADMIN — DIPHENHYDRAMINE HCL 25 MG: 25 TABLET, COATED ORAL at 12:50

## 2022-05-23 RX ADMIN — KETOROLAC TROMETHAMINE 15 MG: 30 INJECTION, SOLUTION INTRAMUSCULAR at 12:52

## 2022-05-23 RX ADMIN — METOCLOPRAMIDE 10 MG: 5 INJECTION, SOLUTION INTRAMUSCULAR; INTRAVENOUS at 12:53

## 2022-05-23 RX ADMIN — SODIUM CHLORIDE 1000 ML: 0.9 INJECTION, SOLUTION INTRAVENOUS at 11:43

## 2022-05-23 NOTE — ED PROVIDER NOTES
History  Chief Complaint   Patient presents with    Chest Pain     Patient reports chest pain since Friday  Patient describes it as "stabbing" with dizziness and radiates to neck     This is a 53-year-old female with past medical history of chest pain and GERD who presents today with chest pain since Friday  Patient describes the pain as stabbing  The pain is located midsternally and radiates into her neck, back and left shoulder  She is also having headaches, neck pain, dizziness and vomiting  All the symptoms started Friday as well  Dizziness is described as the room spinning and occurs when patient is walking or even just sitting there  Denies taking any medications at home  Denies any fever, chills, abdominal pain, diarrhea, body aches, cough, SOB  No leg pain or swelling  No recent traveling  After review of her chart, patient did call her family practice on Friday who advised patient to come to the ER however she did not  It also appears that she does follow with [facility] for chest pain and has had a stress test as well as an echo performed recently  She also follows with GI for GERD  5/11/22 Echo  -Non-diagnostic stress ECG for ischemia due to failure to achieve 85% maximum predicted heart rate  Not diagnostic of ischemia at level of stress achieved  -Good exercise tolerance  -Hypertensive at rest with hypertensive diastolic BP response to exercise    5/3/22 Stress Test:  Stress ECG is borderline positive for myocardial ischemia  Patient has excellent exercise capacity for age and gender  10 3 METS  Patient has no angina during stress test   Patient Blood pressure demonstrated a normal response and heart rate demonstrated a normal response to stress  Prior to Admission Medications   Prescriptions Last Dose Informant Patient Reported? Taking?    albuterol (PROVENTIL HFA,VENTOLIN HFA) 90 mcg/act inhaler   Yes No   Sig: Inhale 2 puffs every 6 (six) hours as needed for wheezing dicyclomine (BENTYL) 20 mg tablet   No No   Sig: Take 1 tablet (20 mg total) by mouth 2 (two) times a day as needed (abdominal pain)   lidocaine (LIDODERM) 5 %   No No   Sig: Apply 1 patch topically daily Remove & Discard patch within 12 hours or as directed by MD   meloxicam (Mobic) 15 mg tablet   No No   Sig: Take 1 tablet (15 mg total) by mouth daily   methocarbamol (ROBAXIN) 500 mg tablet   No No   Sig: Take 1 tablet (500 mg total) by mouth 2 (two) times a day   naproxen (NAPROSYN) 500 mg tablet   No No   Sig: Take 1 tablet (500 mg total) by mouth every 12 (twelve) hours as needed for mild pain or moderate pain   Patient not taking: Reported on 1/18/2021   omeprazole (PriLOSEC) 20 mg delayed release capsule   No No   Sig: Take 1 capsule (20 mg total) by mouth daily   ondansetron (ZOFRAN-ODT) 4 mg disintegrating tablet   No No   Sig: Take 1 tablet (4 mg total) by mouth every 8 (eight) hours as needed for nausea or vomiting for up to 12 doses   sucralfate (CARAFATE) 1 g tablet   No No   Sig: Take 1 tablet (1 g total) by mouth 4 (four) times a day      Facility-Administered Medications: None       Past Medical History:   Diagnosis Date    Asthma     Colitis        Past Surgical History:   Procedure Laterality Date    ABDOMINAL SURGERY         History reviewed  No pertinent family history  I have reviewed and agree with the history as documented  E-Cigarette/Vaping    E-Cigarette Use Never User      E-Cigarette/Vaping Substances    Nicotine No     THC No     CBD No     Flavoring No     Other No     Unknown No      Social History     Tobacco Use    Smoking status: Never Smoker    Smokeless tobacco: Never Used   Vaping Use    Vaping Use: Never used   Substance Use Topics    Alcohol use: Not Currently    Drug use: No       Review of Systems   Constitutional: Negative for chills and fever  HENT: Negative for congestion, ear pain and sore throat  Eyes: Negative for pain and visual disturbance  Respiratory: Negative for cough and shortness of breath  Cardiovascular: Positive for chest pain and palpitations  Gastrointestinal: Positive for vomiting  Negative for abdominal pain, diarrhea and nausea  Genitourinary: Negative for decreased urine volume, dysuria and hematuria  Musculoskeletal: Positive for neck pain  Negative for arthralgias, back pain and neck stiffness  Skin: Negative for color change and rash  Neurological: Positive for dizziness and headaches  Negative for seizures and syncope  All other systems reviewed and are negative  Physical Exam  Physical Exam  Vitals and nursing note reviewed  Constitutional:       General: She is not in acute distress  Appearance: She is well-developed  HENT:      Head: Normocephalic and atraumatic  Eyes:      Extraocular Movements: Extraocular movements intact  Conjunctiva/sclera: Conjunctivae normal       Pupils: Pupils are equal, round, and reactive to light  Cardiovascular:      Rate and Rhythm: Normal rate and regular rhythm  Heart sounds: Normal heart sounds  No murmur heard  Pulmonary:      Effort: Pulmonary effort is normal  No respiratory distress  Breath sounds: Normal breath sounds  No decreased breath sounds, wheezing, rhonchi or rales  Chest:      Chest wall: No mass or tenderness  Abdominal:      Palpations: Abdomen is soft  Tenderness: There is no abdominal tenderness  Musculoskeletal:         General: Normal range of motion  Cervical back: Normal range of motion and neck supple  Right lower leg: No tenderness  No edema  Left lower leg: No tenderness  No edema  Skin:     General: Skin is warm and dry  Capillary Refill: Capillary refill takes less than 2 seconds  Neurological:      General: No focal deficit present  Mental Status: She is alert and oriented to person, place, and time     Psychiatric:         Mood and Affect: Mood normal          Behavior: Behavior normal          Vital Signs  ED Triage Vitals   Temperature Pulse Respirations Blood Pressure SpO2   05/23/22 1018 05/23/22 1015 05/23/22 1015 05/23/22 1015 05/23/22 1015   97 8 °F (36 6 °C) 68 20 154/95 97 %      Temp Source Heart Rate Source Patient Position - Orthostatic VS BP Location FiO2 (%)   05/23/22 1018 05/23/22 1015 05/23/22 1015 05/23/22 1015 --   Oral Monitor Sitting Right arm       Pain Score       05/23/22 1018       8           Vitals:    05/23/22 1015 05/23/22 1145 05/23/22 1245   BP: 154/95 166/95 138/83   Pulse: 68 68 62   Patient Position - Orthostatic VS: Sitting Sitting          Visual Acuity      ED Medications  Medications   sodium chloride 0 9 % bolus 1,000 mL (0 mL Intravenous Stopped 5/23/22 1255)   metoclopramide (REGLAN) injection 10 mg (10 mg Intravenous Given 5/23/22 1253)   diphenhydrAMINE (BENADRYL) tablet 25 mg (25 mg Oral Given 5/23/22 1250)   ketorolac (TORADOL) injection 15 mg (15 mg Intravenous Given 5/23/22 1252)       Diagnostic Studies  Results Reviewed     Procedure Component Value Units Date/Time    HS Troponin 0hr (reflex protocol) [[id number]]  (Normal) Collected: 05/23/22 1051    Lab Status: Final result Specimen: Blood from Arm, Left Updated: 05/23/22 1234     hs TnI 0hr <2 ng/L     COVID/FLU/RSV - 2 hour TAT [[id number]]  (Normal) Collected: 05/23/22 1051    Lab Status: Final result Specimen: Nares from Nose Updated: 05/23/22 1150     SARS-CoV-2 Negative     INFLUENZA A PCR Negative     INFLUENZA B PCR Negative     RSV PCR Negative    Narrative:      FOR PEDIATRIC PATIENTS - copy/paste COVID Guidelines URL to browser: [email or web address]    SARS-CoV-2 assay is a Nucleic Acid Amplification assay intended for the  qualitative detection of nucleic acid from SARS-CoV-2 in nasopharyngeal  swabs  Results are for the presumptive identification of SARS-CoV-2 RNA      Positive results are indicative of infection with SARS-CoV-2, the virus  causing COVID-19, but do not rule out bacterial infection or co-infection  with other viruses  Laboratories within the [place] and its  territories are required to report all positive results to the appropriate  public health authorities  Negative results do not preclude SARS-CoV-2  infection and should not be used as the sole basis for treatment or other  patient management decisions  Negative results must be combined with  clinical observations, patient history, and epidemiological information  This test has not been FDA cleared or approved  This test has been authorized by FDA under an Emergency Use Authorization  (EUA)  This test is only authorized for the duration of time the  declaration that circumstances exist justifying the authorization of the  emergency use of an in vitro diagnostic tests for detection of SARS-CoV-2  virus and/or diagnosis of COVID-19 infection under section 564(b)(1) of  the Act, 21 U  S C  094KBN-1(S)(8), unless the authorization is terminated  or revoked sooner  The test has been validated but independent review by FDA  and CLIA is pending  Test performed using [employer] GeneXpert: This RT-PCR assay targets N2,  a region unique to SARS-CoV-2  A conserved region in the E-gene was chosen  for pan-Sarbecovirus detection which includes SARS-CoV-2      Basic metabolic panel [[id number]] Collected: 05/23/22 1051    Lab Status: Final result Specimen: Blood from Arm, Left Updated: 05/23/22 1146     Sodium 139 mmol/L      Potassium 3 7 mmol/L      Chloride 105 mmol/L      CO2 26 mmol/L      ANION GAP 8 mmol/L      BUN 20 mg/dL      Creatinine 0 94 mg/dL      Glucose 89 mg/dL      Calcium 9 3 mg/dL      eGFR 71 ml/min/1 73sq m     Narrative:      [place] guidelines for Chronic Kidney Disease (CKD):     Stage 1 with normal or high GFR (GFR > 90 mL/min/1 73 square meters)    Stage 2 Mild CKD (GFR = 60-89 mL/min/1 73 square meters)   Stage 3A Moderate CKD (GFR = 45-59 mL/min/1 73 square meters)    Stage 3B Moderate CKD (GFR = 30-44 mL/min/1 73 square meters)    Stage 4 Severe CKD (GFR = 15-29 mL/min/1 73 square meters)    Stage 5 End Stage CKD (GFR <15 mL/min/1 73 square meters)  Note: GFR calculation is accurate only with a steady state creatinine    TSH [[id number]]  (Normal) Collected: 05/23/22 1051    Lab Status: Final result Specimen: Blood from Arm, Left Updated: 05/23/22 1146     TSH 3RD GENERATON 1 708 uIU/mL     Narrative:      Patients undergoing fluorescein dye angiography may retain small amounts of fluorescein in the body for 48-72 hours post procedure  Samples containing fluorescein can produce falsely depressed TSH values  If the patient had this procedure,a specimen should be resubmitted post fluorescein clearance  CBC and differential [[id number]] Collected: 05/23/22 1051    Lab Status: Final result Specimen: Blood from Arm, Left Updated: 05/23/22 1105     WBC 6 36 Thousand/uL      RBC 4 57 Million/uL      Hemoglobin 14 6 g/dL      Hematocrit 44 3 %      MCV 97 fL      MCH 31 9 pg      MCHC 33 0 g/dL      RDW 12 3 %      MPV 10 8 fL      Platelets 738 Thousands/uL      nRBC 0 /100 WBCs      Neutrophils Relative 54 %      Immat GRANS % 0 %      Lymphocytes Relative 33 %      Monocytes Relative 8 %      Eosinophils Relative 4 %      Basophils Relative 1 %      Neutrophils Absolute 3 51 Thousands/µL      Immature Grans Absolute 0 01 Thousand/uL      Lymphocytes Absolute 2 08 Thousands/µL      Monocytes Absolute 0 49 Thousand/µL      Eosinophils Absolute 0 24 Thousand/µL      Basophils Absolute 0 03 Thousands/µL                  X-ray chest 1 view portable   Final Result by John Glaser MD (05/23 1220)      No acute cardiopulmonary disease        Findings are stable            Workstation performed: [id number]                    Procedures  Procedures         ED Course  ED Course as of 05/23/22 1542   Mon May 23, 2022 1227 X-ray chest 1 view portable  No acute cardiopulmonary disease      Findings are stable      1238 hs TnI 0hr: <2   1240 ECG 12 lead  Sinus rhythm with sinus arrhythmia               SBIRT 22yo+    Flowsheet Row Most Recent Value   SBIRT (25 yo +)    In order to provide better care to our patients, we are screening all of our patients for alcohol and drug use  Would it be okay to ask you these screening questions? No Filed at: 05/23/2022 1318             HEART Risk Score    Flowsheet Row Most Recent Value   Heart Score Risk Calculator    History 1 Filed at: 05/23/2022 1404   ECG 0 Filed at: 05/23/2022 1404   Age 1 Filed at: 05/23/2022 1404   Risk Factors 1 Filed at: 05/23/2022 1404   Troponin 0 Filed at: 05/23/2022 1404   HEART Score 3 Filed at: 05/23/2022 1404                  MDM  Number of Diagnoses or Management Options  Atypical chest pain: new and requires workup  Diagnosis management comments: This is a 57-year-old female who presents with chest pain since Friday  Patient was told to come to the ER on Friday however she did not  Physical exam was unremarkable however patient did seem anxious  Cardiac and lung exam normal   Patient had full range of motion of her neck  CBC and CMP normal EKG showed sinus arrhythmia which was unchanged from previous EKGs  Trop <2, 2hr and 4hr cancelled  TSH normal   Chest x-ray showed no acute cardiopulmonary disease  COVID and flu negative  ACS unlikely as EKG is normal and patient had recent cardiac workup  Suspect that her neck pain is related to her headache and chest pain could be related to anxiety  Symptoms did improve with IV fluids, Benadryl, Reglan and Toradol  Patient was given strict instructions to follow up with family medicine  We also discussed a monitor that she could ask her cardiologist about to further monitor her heart rate      After review of her charts it appears that she does follow with Family Medicine and Cardiology regarding the chest pain, recently had stress test and echo performed which were normal         Amount and/or Complexity of Data Reviewed  Clinical lab tests: ordered and reviewed  Tests in the radiology section of CPT®: ordered and reviewed  Decide to obtain previous medical records or to obtain history from someone other than the patient: yes  Obtain history from someone other than the patient: yes  Review and summarize past medical records: yes  Independent visualization of images, tracings, or specimens: yes    Patient Progress  Patient progress: stable      Disposition  Final diagnoses:   Atypical chest pain     Time reflects when diagnosis was documented in both MDM as applicable and the Disposition within this note     Time User Action Codes Description Comment    5/23/2022  1:03 PM Melany Johnson Add [R07 [id number]] Atypical chest pain       ED Disposition     ED Disposition   Discharge    Condition   Stable    Date/Time   Mon May 23, 2022  1:03 PM    Comment   [address] discharge to home/self care                 Follow-up Information     Follow up With Specialties Details Why Contact Info Additional Information    Breezy Christian MD Family Medicine Schedule an appointment as soon as possible for a visit   [address] [place] [address] [place] [phone]  [facility] [address] Emergency Department Emergency Medicine  If symptoms worsen [facility] [phone]  [address] [facility] Emergency Department, [address] [place]  , [facility] [address] [facility], [id number]          Discharge Medication List as of 5/23/2022  1:03 PM      CONTINUE these medications which have NOT CHANGED    Details   albuterol (PROVENTIL HFA,VENTOLIN HFA) 90 mcg/act inhaler Inhale 2 puffs every 6 (six) hours as needed for wheezing, Historical Med      dicyclomine (BENTYL) 20 mg tablet Take 1 tablet (20 mg total) by mouth 2 (two) times a day as needed (abdominal pain), Starting Mon 1/18/2021, Normal      lidocaine (LIDODERM) 5 % Apply 1 patch topically daily Remove & Discard patch within 12 hours or as directed by MD, Starting Fri 5/14/2021, Normal      meloxicam (Mobic) 15 mg tablet Take 1 tablet (15 mg total) by mouth daily, Starting Mon 2/21/2022, Normal      methocarbamol (ROBAXIN) 500 mg tablet Take 1 tablet (500 mg total) by mouth 2 (two) times a day, Starting Fri 5/14/2021, Normal      naproxen (NAPROSYN) 500 mg tablet Take 1 tablet (500 mg total) by mouth every 12 (twelve) hours as needed for mild pain or moderate pain, Starting Fri 2/28/2020, Print      omeprazole (PriLOSEC) 20 mg delayed release capsule Take 1 capsule (20 mg total) by mouth daily, Starting Fri 7/30/2021, Print      ondansetron (ZOFRAN-ODT) 4 mg disintegrating tablet Take 1 tablet (4 mg total) by mouth every 8 (eight) hours as needed for nausea or vomiting for up to 12 doses, Starting Tue 3/8/2022, Normal      sucralfate (CARAFATE) 1 g tablet Take 1 tablet (1 g total) by mouth 4 (four) times a day, Starting Fri 7/30/2021, Print             No discharge procedures on file      PDMP Review     None          ED Provider  Electronically Signed by           Chester Siddiqui PA-C  05/23/22 Pedro Hickey PA-C  05/23/22 [id number]

## 2022-08-09 ENCOUNTER — HOSPITAL ENCOUNTER (EMERGENCY)
Facility: HOSPITAL | Age: 52
Discharge: HOME | End: 2022-08-09
Attending: EMERGENCY MEDICINE
Payer: COMMERCIAL

## 2022-08-09 ENCOUNTER — APPOINTMENT (EMERGENCY)
Dept: RADIOLOGY | Facility: HOSPITAL | Age: 52
End: 2022-08-09
Payer: COMMERCIAL

## 2022-08-09 VITALS
TEMPERATURE: 99.3 F | OXYGEN SATURATION: 97 % | RESPIRATION RATE: 16 BRPM | SYSTOLIC BLOOD PRESSURE: 116 MMHG | WEIGHT: 146.39 LBS | DIASTOLIC BLOOD PRESSURE: 77 MMHG | HEART RATE: 76 BPM

## 2022-08-09 DIAGNOSIS — N12 PYELONEPHRITIS: Primary | ICD-10-CM

## 2022-08-09 DIAGNOSIS — J45.909 ASTHMA: ICD-10-CM

## 2022-08-09 LAB
ALBUMIN SERPL BCP-MCNC: 4.5 G/DL (ref 3.5–5)
ALP SERPL-CCNC: 81 U/L (ref 43–122)
ALT SERPL W P-5'-P-CCNC: 19 U/L
ANION GAP SERPL CALCULATED.3IONS-SCNC: 7 MMOL/L (ref 5–14)
AST SERPL W P-5'-P-CCNC: 25 U/L (ref 14–36)
ATRIAL RATE: 76 BPM
BACTERIA UR QL AUTO: ABNORMAL /HPF
BASOPHILS # BLD AUTO: 0.04 THOUSANDS/ΜL (ref 0–0.1)
BASOPHILS NFR BLD AUTO: 1 % (ref 0–1)
BILIRUB SERPL-MCNC: 1.03 MG/DL (ref 0.2–1)
BILIRUB UR QL STRIP: NEGATIVE
BUN SERPL-MCNC: 24 MG/DL (ref 5–25)
CALCIUM SERPL-MCNC: 9 MG/DL (ref 8.4–10.2)
CARDIAC TROPONIN I PNL SERPL HS: 2 NG/L
CHLORIDE SERPL-SCNC: 109 MMOL/L (ref 96–108)
CLARITY UR: ABNORMAL
CO2 SERPL-SCNC: 25 MMOL/L (ref 21–32)
COLOR UR: ABNORMAL
CREAT SERPL-MCNC: 0.86 MG/DL (ref 0.6–1.2)
EOSINOPHIL # BLD AUTO: 0.27 THOUSAND/ΜL (ref 0–0.61)
EOSINOPHIL NFR BLD AUTO: 4 % (ref 0–6)
ERYTHROCYTE [DISTWIDTH] IN BLOOD BY AUTOMATED COUNT: 12.1 % (ref 11.6–15.1)
EXT PREG TEST URINE: NEGATIVE
EXT. CONTROL ED NAV: NORMAL
GFR SERPL CREATININE-BSD FRML MDRD: 79 ML/MIN/1.73SQ M
GLUCOSE SERPL-MCNC: 96 MG/DL (ref 70–99)
GLUCOSE UR STRIP-MCNC: NEGATIVE MG/DL
HCT VFR BLD AUTO: 41.9 % (ref 34.8–46.1)
HGB BLD-MCNC: 13.5 G/DL (ref 11.5–15.4)
HGB UR QL STRIP.AUTO: 50
HYALINE CASTS #/AREA URNS LPF: ABNORMAL /LPF
IMM GRANULOCYTES # BLD AUTO: 0.01 THOUSAND/UL (ref 0–0.2)
IMM GRANULOCYTES NFR BLD AUTO: 0 % (ref 0–2)
KETONES UR STRIP-MCNC: NEGATIVE MG/DL
LEUKOCYTE ESTERASE UR QL STRIP: 100
LYMPHOCYTES # BLD AUTO: 1.89 THOUSANDS/ΜL (ref 0.6–4.47)
LYMPHOCYTES NFR BLD AUTO: 31 % (ref 14–44)
MCH RBC QN AUTO: 30.7 PG (ref 26.8–34.3)
MCHC RBC AUTO-ENTMCNC: 32.2 G/DL (ref 31.4–37.4)
MCV RBC AUTO: 95 FL (ref 82–98)
MONOCYTES # BLD AUTO: 0.47 THOUSAND/ΜL (ref 0.17–1.22)
MONOCYTES NFR BLD AUTO: 8 % (ref 4–12)
MUCOUS THREADS UR QL AUTO: ABNORMAL
NEUTROPHILS # BLD AUTO: 3.41 THOUSANDS/ΜL (ref 1.85–7.62)
NEUTS SEG NFR BLD AUTO: 56 % (ref 43–75)
NITRITE UR QL STRIP: NEGATIVE
NON-SQ EPI CELLS URNS QL MICRO: ABNORMAL /HPF
NRBC BLD AUTO-RTO: 0 /100 WBCS
NT-PROBNP SERPL-MCNC: 29.8 PG/ML (ref 0–299)
P AXIS: 21 DEGREES
PH UR STRIP.AUTO: 5 [PH]
PLATELET # BLD AUTO: 300 THOUSANDS/UL (ref 149–390)
PMV BLD AUTO: 10.5 FL (ref 8.9–12.7)
POTASSIUM SERPL-SCNC: 4.2 MMOL/L (ref 3.5–5.3)
PR INTERVAL: 118 MS
PROT SERPL-MCNC: 7.9 G/DL (ref 6.4–8.4)
PROT UR STRIP-MCNC: ABNORMAL MG/DL
QRS AXIS: 39 DEGREES
QRSD INTERVAL: 82 MS
QT INTERVAL: 398 MS
QTC INTERVAL: 447 MS
RBC # BLD AUTO: 4.4 MILLION/UL (ref 3.81–5.12)
RBC #/AREA URNS AUTO: ABNORMAL /HPF
SARS-COV-2 RNA RESP QL NAA+PROBE: NEGATIVE
SODIUM SERPL-SCNC: 141 MMOL/L (ref 135–147)
SP GR UR STRIP.AUTO: 1.02 (ref 1–1.04)
T WAVE AXIS: 9 DEGREES
UROBILINOGEN UA: NEGATIVE MG/DL
VENTRICULAR RATE: 76 BPM
WBC # BLD AUTO: 6.09 THOUSAND/UL (ref 4.31–10.16)
WBC #/AREA URNS AUTO: ABNORMAL /HPF

## 2022-08-09 PROCEDURE — 81001 URINALYSIS AUTO W/SCOPE: CPT | Performed by: EMERGENCY MEDICINE

## 2022-08-09 PROCEDURE — 96365 THER/PROPH/DIAG IV INF INIT: CPT

## 2022-08-09 PROCEDURE — U0005 INFEC AGEN DETEC AMPLI PROBE: HCPCS | Performed by: EMERGENCY MEDICINE

## 2022-08-09 PROCEDURE — 80053 COMPREHEN METABOLIC PANEL: CPT | Performed by: EMERGENCY MEDICINE

## 2022-08-09 PROCEDURE — U0003 INFECTIOUS AGENT DETECTION BY NUCLEIC ACID (DNA OR RNA); SEVERE ACUTE RESPIRATORY SYNDROME CORONAVIRUS 2 (SARS-COV-2) (CORONAVIRUS DISEASE [COVID-19]), AMPLIFIED PROBE TECHNIQUE, MAKING USE OF HIGH THROUGHPUT TECHNOLOGIES AS DESCRIBED BY CMS-2020-01-R: HCPCS | Performed by: EMERGENCY MEDICINE

## 2022-08-09 PROCEDURE — 81025 URINE PREGNANCY TEST: CPT | Performed by: EMERGENCY MEDICINE

## 2022-08-09 PROCEDURE — 93005 ELECTROCARDIOGRAM TRACING: CPT

## 2022-08-09 PROCEDURE — 71045 X-RAY EXAM CHEST 1 VIEW: CPT

## 2022-08-09 PROCEDURE — 99284 EMERGENCY DEPT VISIT MOD MDM: CPT | Performed by: EMERGENCY MEDICINE

## 2022-08-09 PROCEDURE — 93010 ELECTROCARDIOGRAM REPORT: CPT | Performed by: INTERNAL MEDICINE

## 2022-08-09 PROCEDURE — 36415 COLL VENOUS BLD VENIPUNCTURE: CPT | Performed by: EMERGENCY MEDICINE

## 2022-08-09 PROCEDURE — 83880 ASSAY OF NATRIURETIC PEPTIDE: CPT | Performed by: EMERGENCY MEDICINE

## 2022-08-09 PROCEDURE — 85025 COMPLETE CBC W/AUTO DIFF WBC: CPT | Performed by: EMERGENCY MEDICINE

## 2022-08-09 PROCEDURE — 84484 ASSAY OF TROPONIN QUANT: CPT | Performed by: EMERGENCY MEDICINE

## 2022-08-09 PROCEDURE — 99284 EMERGENCY DEPT VISIT MOD MDM: CPT

## 2022-08-09 PROCEDURE — 96375 TX/PRO/DX INJ NEW DRUG ADDON: CPT

## 2022-08-09 RX ORDER — IBUPROFEN 400 MG/1
400 TABLET ORAL EVERY 6 HOURS PRN
Qty: 30 TABLET | Refills: 0 | Status: SHIPPED | OUTPATIENT
Start: 2022-08-09 | End: 2022-08-16

## 2022-08-09 RX ORDER — CEFTRIAXONE 1 G/50ML
1000 INJECTION, SOLUTION INTRAVENOUS ONCE
Status: COMPLETED | OUTPATIENT
Start: 2022-08-09 | End: 2022-08-09

## 2022-08-09 RX ORDER — KETOROLAC TROMETHAMINE 30 MG/ML
15 INJECTION, SOLUTION INTRAMUSCULAR; INTRAVENOUS ONCE
Status: COMPLETED | OUTPATIENT
Start: 2022-08-09 | End: 2022-08-09

## 2022-08-09 RX ORDER — ACETAMINOPHEN 325 MG/1
650 TABLET ORAL ONCE
Status: COMPLETED | OUTPATIENT
Start: 2022-08-09 | End: 2022-08-09

## 2022-08-09 RX ORDER — CEPHALEXIN 500 MG/1
500 CAPSULE ORAL EVERY 12 HOURS SCHEDULED
Qty: 14 CAPSULE | Refills: 0 | Status: SHIPPED | OUTPATIENT
Start: 2022-08-09 | End: 2022-08-16

## 2022-08-09 RX ORDER — IBUPROFEN 600 MG/1
600 TABLET ORAL ONCE
Status: DISCONTINUED | OUTPATIENT
Start: 2022-08-09 | End: 2022-08-09

## 2022-08-09 RX ORDER — ALBUTEROL SULFATE 90 UG/1
2 AEROSOL, METERED RESPIRATORY (INHALATION) EVERY 6 HOURS PRN
Qty: 18 G | Refills: 0 | Status: SHIPPED | OUTPATIENT
Start: 2022-08-09

## 2022-08-09 RX ADMIN — ACETAMINOPHEN 650 MG: 325 TABLET ORAL at 10:39

## 2022-08-09 RX ADMIN — CEFTRIAXONE 1000 MG: 1 INJECTION, SOLUTION INTRAVENOUS at 12:09

## 2022-08-09 RX ADMIN — KETOROLAC TROMETHAMINE 15 MG: 30 INJECTION, SOLUTION INTRAMUSCULAR; INTRAVENOUS at 12:04

## 2022-08-09 NOTE — ED PROVIDER NOTES
History  Chief Complaint   Patient presents with    Back Pain     Left and right mid-back pain  Worse when breathing and when laying down    Dizziness     Has also been dizzy     26-year-old female presented emergency room with midsternal chest pain as well as bilateral flank pain starting 1 week ago  Notes that now having some shortness of breath as well  Worse with laying down  Also started feeling lightheadedness yesterday  Denies nausea vomiting  Does note foul-smelling urine  Denies vaginal discharge  History provided by:  Patient  Back Pain  Location:  Lumbar spine  Quality:  Aching  Pain severity:  Moderate  Pain is:  Same all the time  Onset quality:  Gradual  Duration:  2 weeks  Timing:  Constant  Progression:  Worsening  Associated symptoms: chest pain and dysuria    Associated symptoms: no abdominal pain and no fever        Prior to Admission Medications   Prescriptions Last Dose Informant Patient Reported? Taking?    albuterol (PROVENTIL HFA,VENTOLIN HFA) 90 mcg/act inhaler   Yes No   Sig: Inhale 2 puffs every 6 (six) hours as needed for wheezing   albuterol (PROVENTIL HFA,VENTOLIN HFA) 90 mcg/act inhaler   No Yes   Sig: Inhale 2 puffs every 6 (six) hours as needed for wheezing   dicyclomine (BENTYL) 20 mg tablet   No No   Sig: Take 1 tablet (20 mg total) by mouth 2 (two) times a day as needed (abdominal pain)   lidocaine (LIDODERM) 5 %   No No   Sig: Apply 1 patch topically daily Remove & Discard patch within 12 hours or as directed by MD   meloxicam (Mobic) 15 mg tablet   No No   Sig: Take 1 tablet (15 mg total) by mouth daily   methocarbamol (ROBAXIN) 500 mg tablet   No No   Sig: Take 1 tablet (500 mg total) by mouth 2 (two) times a day   naproxen (NAPROSYN) 500 mg tablet   No No   Sig: Take 1 tablet (500 mg total) by mouth every 12 (twelve) hours as needed for mild pain or moderate pain   Patient not taking: Reported on 1/18/2021   omeprazole (PriLOSEC) 20 mg delayed release capsule   No No   Sig: Take 1 capsule (20 mg total) by mouth daily   ondansetron (ZOFRAN-ODT) 4 mg disintegrating tablet   No No   Sig: Take 1 tablet (4 mg total) by mouth every 8 (eight) hours as needed for nausea or vomiting for up to 12 doses   sucralfate (CARAFATE) 1 g tablet   No No   Sig: Take 1 tablet (1 g total) by mouth 4 (four) times a day      Facility-Administered Medications: None       Past Medical History:   Diagnosis Date    Asthma     Colitis        Past Surgical History:   Procedure Laterality Date    ABDOMINAL SURGERY         History reviewed  No pertinent family history  I have reviewed and agree with the history as documented  E-Cigarette/Vaping    E-Cigarette Use Never User      E-Cigarette/Vaping Substances    Nicotine No     THC No     CBD No     Flavoring No     Other No     Unknown No      Social History     Tobacco Use    Smoking status: Never Smoker    Smokeless tobacco: Never Used   Vaping Use    Vaping Use: Never used   Substance Use Topics    Alcohol use: Not Currently    Drug use: No       Review of Systems   Constitutional: Negative for chills and fever  HENT: Negative for ear pain and sore throat  Eyes: Negative for pain and visual disturbance  Respiratory: Negative for cough and shortness of breath  Cardiovascular: Positive for chest pain  Negative for palpitations  Gastrointestinal: Negative for abdominal pain and vomiting  Genitourinary: Positive for dysuria  Negative for hematuria  Musculoskeletal: Positive for back pain  Negative for arthralgias  Skin: Negative for color change and rash  Neurological: Negative for seizures and syncope  All other systems reviewed and are negative  Physical Exam  Physical Exam  Vitals and nursing note reviewed  Constitutional:       General: She is not in acute distress  Appearance: She is well-developed  HENT:      Head: Normocephalic and atraumatic        Nose: Nose normal       Mouth/Throat:      Mouth: Mucous membranes are moist    Eyes:      Conjunctiva/sclera: Conjunctivae normal    Cardiovascular:      Rate and Rhythm: Normal rate and regular rhythm  Heart sounds: No murmur heard  Pulmonary:      Effort: Pulmonary effort is normal  No respiratory distress  Breath sounds: Normal breath sounds  Abdominal:      Palpations: Abdomen is soft  Tenderness: There is no abdominal tenderness  There is right CVA tenderness and left CVA tenderness  Musculoskeletal:      Cervical back: Neck supple  Skin:     General: Skin is warm and dry  Capillary Refill: Capillary refill takes less than 2 seconds  Neurological:      Mental Status: She is alert and oriented to person, place, and time           Vital Signs  ED Triage Vitals   Temperature Pulse Respirations Blood Pressure SpO2   08/09/22 1009 08/09/22 1009 08/09/22 1009 08/09/22 1009 08/09/22 1009   99 3 °F (37 4 °C) 70 20 116/70 97 %      Temp Source Heart Rate Source Patient Position - Orthostatic VS BP Location FiO2 (%)   08/09/22 1009 08/09/22 1009 08/09/22 1009 08/09/22 1009 --   Tympanic Monitor Sitting Left arm       Pain Score       08/09/22 1039       8           Vitals:    08/09/22 1009 08/09/22 1052   BP: 116/70 116/77   Pulse: 70 76   Patient Position - Orthostatic VS: Sitting Lying         Visual Acuity      ED Medications  Medications   acetaminophen (TYLENOL) tablet 650 mg (650 mg Oral Given 8/9/22 1039)   cefTRIAXone (ROCEPHIN) IVPB (premix in dextrose) 1,000 mg 50 mL (0 mg Intravenous Stopped 8/9/22 1234)   ketorolac (TORADOL) injection 15 mg (15 mg Intravenous Given 8/9/22 1204)       Diagnostic Studies  Results Reviewed     Procedure Component Value Units Date/Time    COVID only [[id number]]  (Normal) Collected: 08/09/22 1038    Lab Status: Final result Specimen: Nares from Nose Updated: 08/09/22 1154     SARS-CoV-2 Negative    Narrative:      FOR PEDIATRIC PATIENTS - copy/paste COVID Guidelines URL to browser: [email or web address] [email or web address]  [email or web address]    SARS-CoV-2 assay is a Nucleic Acid Amplification assay intended for the  qualitative detection of nucleic acid from SARS-CoV-2 in nasopharyngeal  swabs  Results are for the presumptive identification of SARS-CoV-2 RNA  Positive results are indicative of infection with SARS-CoV-2, the virus  causing COVID-19, but do not rule out bacterial infection or co-infection  with other viruses  Laboratories within the [place] and its  territories are required to report all positive results to the appropriate  public health authorities  Negative results do not preclude SARS-CoV-2  infection and should not be used as the sole basis for treatment or other  patient management decisions  Negative results must be combined with  clinical observations, patient history, and epidemiological information  This test has not been FDA cleared or approved  This test has been authorized by FDA under an Emergency Use Authorization  (EUA)  This test is only authorized for the duration of time the  declaration that circumstances exist justifying the authorization of the  emergency use of an in vitro diagnostic tests for detection of SARS-CoV-2  virus and/or diagnosis of COVID-19 infection under section 564(b)(1) of  the Act, 21 U  S C  772YPJ-0(A)(1), unless the authorization is terminated  or revoked sooner  The test has been validated but independent review by FDA  and CLIA is pending  Test performed using [employer] GeneXpert: This RT-PCR assay targets N2,  a region unique to SARS-CoV-2  A conserved region in the E-gene was chosen  for pan-Sarbecovirus detection which includes SARS-CoV-2      HS Troponin 0hr (reflex protocol) [[id number]]  (Normal) Collected: 08/09/22 1038    Lab Status: Final result Specimen: Blood from Arm, Right Updated: 08/09/22 1130     hs TnI 0hr 2 ng/L     NT-BNP PRO [[id number]]  (Normal) Collected: 08/09/22 1038    Lab Status: Final result Specimen: Blood from Arm, Right Updated: 08/09/22 1125     NT-proBNP 29 8 pg/mL     Comprehensive metabolic panel [[id number]]  (Abnormal) Collected: 08/09/22 1038    Lab Status: Final result Specimen: Blood from Arm, Right Updated: 08/09/22 1116     Sodium 141 mmol/L      Potassium 4 2 mmol/L      Chloride 109 mmol/L      CO2 25 mmol/L      ANION GAP 7 mmol/L      BUN 24 mg/dL      Creatinine 0 86 mg/dL      Glucose 96 mg/dL      Calcium 9 0 mg/dL      AST 25 U/L      ALT 19 U/L      Alkaline Phosphatase 81 U/L      Total Protein 7 9 g/dL      Albumin 4 5 g/dL      Total Bilirubin 1 03 mg/dL      eGFR 79 ml/min/1 73sq m     Narrative:      Hemolysis  National Kidney Disease Foundation guidelines for Chronic Kidney Disease (CKD):     Stage 1 with normal or high GFR (GFR > 90 mL/min/1 73 square meters)    Stage 2 Mild CKD (GFR = 60-89 mL/min/1 73 square meters)    Stage 3A Moderate CKD (GFR = 45-59 mL/min/1 73 square meters)    Stage 3B Moderate CKD (GFR = 30-44 mL/min/1 73 square meters)    Stage 4 Severe CKD (GFR = 15-29 mL/min/1 73 square meters)    Stage 5 End Stage CKD (GFR <15 mL/min/1 73 square meters)  Note: GFR calculation is accurate only with a steady state creatinine    Urine Microscopic [[id number]]  (Abnormal) Collected: 08/09/22 1036    Lab Status: Final result Specimen: Urine, Clean Catch Updated: 08/09/22 1113     RBC, UA 4-10 /hpf      WBC, UA 4-10 /hpf      Epithelial Cells Moderate /hpf      Bacteria, UA Moderate /hpf      Hyaline Casts, UA 0-1 /lpf      MUCUS THREADS Innumerable    UA (URINE) with reflex to Scope [[id number]]  (Abnormal) Collected: 08/09/22 1036    Lab Status: Final result Specimen: Urine, Clean Catch Updated: 08/09/22 1112     Color, UA Juliet     Clarity, UA Slightly Cloudy     Specific Gravity, UA 1 025     pH, UA 5 0     Leukocytes,  0     Nitrite, UA Negative     Protein, UA 15 (Trace) mg/dl      Glucose, UA Negative mg/dl      Ketones, UA Negative mg/dl Bilirubin, UA Negative     Occult Blood, UA 50 0     UROBILINOGEN UA Negative mg/dL     CBC and differential [[id number]] Collected: 08/09/22 1038    Lab Status: Final result Specimen: Blood from Arm, Right Updated: 08/09/22 1100     WBC 6 09 Thousand/uL      RBC 4 40 Million/uL      Hemoglobin 13 5 g/dL      Hematocrit 41 9 %      MCV 95 fL      MCH 30 7 pg      MCHC 32 2 g/dL      RDW 12 1 %      MPV 10 5 fL      Platelets 358 Thousands/uL      nRBC 0 /100 WBCs      Neutrophils Relative 56 %      Immat GRANS % 0 %      Lymphocytes Relative 31 %      Monocytes Relative 8 %      Eosinophils Relative 4 %      Basophils Relative 1 %      Neutrophils Absolute 3 41 Thousands/µL      Immature Grans Absolute 0 01 Thousand/uL      Lymphocytes Absolute 1 89 Thousands/µL      Monocytes Absolute 0 47 Thousand/µL      Eosinophils Absolute 0 27 Thousand/µL      Basophils Absolute 0 04 Thousands/µL     POCT pregnancy, urine [[id number]]  (Normal) Resulted: 08/09/22 1043    Lab Status: Final result Updated: 08/09/22 1044     EXT PREG TEST UR (Ref: Negative) negative     Control valid                 XR chest 1 view portable   Final Result by Selena Aguilar MD (08/09 1130)      No acute cardiopulmonary disease  Workstation performed: [id number]                    Procedures  Procedures         ED Course  ED Course as of 08/09/22 1554   Tue Aug 09, 2022   1159 UA consistent with UTI, likely pyelonephritis, will treat with ceftriaxone  Keflex at home  SBIRT 20yo+    Flowsheet Row Most Recent Value   SBIRT (25 yo +)    In order to provide better care to our patients, we are screening all of our patients for alcohol and drug use  Would it be okay to ask you these screening questions?  No Filed at: 08/09/2022 1023                    MDM  Number of Diagnoses or Management Options  Asthma  Pyelonephritis  Diagnosis management comments: UA consistent with UTI a couple with CVA tenderness, likely pyelonephritis, will treat with ceftriaxone IV in the ED and Keflex at home  Troponin negative, low risk for ACS  Disposition  Final diagnoses:   Pyelonephritis   Asthma     Time reflects when diagnosis was documented in both MDM as applicable and the Disposition within this note     Time User Action Codes Description Comment    8/9/2022 12:00 PM Fiorella Olson Add [N12] Pyelonephritis     8/9/2022 12:15 PM Fiorella Olson Add [J99 431] Asthma       ED Disposition     ED Disposition   Discharge    Condition   Stable    Date/Time   Tue Aug 9, 2022 12:00 PM    [address] [place] [address] discharge to home/self care                 Follow-up Information     Follow up With Specialties Details Why Contact Info    Ni Castano MD Family Medicine   [address] [place] [id number] [phone]            Discharge Medication List as of 8/9/2022 12:03 PM      START taking these medications    Details   cephalexin (KEFLEX) 500 mg capsule Take 1 capsule (500 mg total) by mouth every 12 (twelve) hours for 7 days, Starting Tue 8/9/2022, Until Tue 8/16/2022, Normal      ibuprofen (MOTRIN) 400 mg tablet Take 1 tablet (400 mg total) by mouth every 6 (six) hours as needed for mild pain (Body aches or fever) for up to 7 days, Starting Tue 8/9/2022, Until Tue 8/16/2022 at 2359, Normal         CONTINUE these medications which have NOT CHANGED    Details   dicyclomine (BENTYL) 20 mg tablet Take 1 tablet (20 mg total) by mouth 2 (two) times a day as needed (abdominal pain), Starting Mon 1/18/2021, Normal      lidocaine (LIDODERM) 5 % Apply 1 patch topically daily Remove & Discard patch within 12 hours or as directed by MD, Starting Fri 5/14/2021, Normal      meloxicam (Mobic) 15 mg tablet Take 1 tablet (15 mg total) by mouth daily, Starting Mon 2/21/2022, Normal      methocarbamol (ROBAXIN) 500 mg tablet Take 1 tablet (500 mg total) by mouth 2 (two) times a day, Starting Fri 5/14/2021, Normal      naproxen (NAPROSYN) 500 mg tablet Take 1 tablet (500 mg total) by mouth every 12 (twelve) hours as needed for mild pain or moderate pain, Starting Fri 2/28/2020, Print      omeprazole (PriLOSEC) 20 mg delayed release capsule Take 1 capsule (20 mg total) by mouth daily, Starting Fri 7/30/2021, Print      ondansetron (ZOFRAN-ODT) 4 mg disintegrating tablet Take 1 tablet (4 mg total) by mouth every 8 (eight) hours as needed for nausea or vomiting for up to 12 doses, Starting Tue 3/8/2022, Normal      sucralfate (CARAFATE) 1 g tablet Take 1 tablet (1 g total) by mouth 4 (four) times a day, Starting Fri 7/30/2021, Print      albuterol (PROVENTIL HFA,VENTOLIN HFA) 90 mcg/act inhaler Inhale 2 puffs every 6 (six) hours as needed for wheezing, Historical Med             No discharge procedures on file      PDMP Review     None          ED Provider  Electronically Signed by           Antoine Yee MD  08/09/22 1499

## 2022-08-09 NOTE — Clinical Note
Abrahan Velasquez was seen and treated in our emergency department on 8/9/2022  Diagnosis:     Aravind Godinez  may return to work on return date  She may return on this date: 08/11/2022         If you have any questions or concerns, please don't hesitate to call        Kathy Stone MD    ______________________________           _______________          _______________  Hospital Representative                              Date                                Time

## 2022-11-03 ENCOUNTER — TELEPHONE (OUTPATIENT)
Dept: GYNECOLOGY | Facility: CLINIC | Age: 52
End: 2022-11-03

## 2022-11-27 ENCOUNTER — APPOINTMENT (EMERGENCY)
Dept: CT IMAGING | Facility: HOSPITAL | Age: 52
End: 2022-11-27

## 2022-11-27 ENCOUNTER — HOSPITAL ENCOUNTER (EMERGENCY)
Facility: HOSPITAL | Age: 52
Discharge: HOME | End: 2022-11-27
Attending: EMERGENCY MEDICINE

## 2022-11-27 VITALS
OXYGEN SATURATION: 97 % | DIASTOLIC BLOOD PRESSURE: 84 MMHG | HEART RATE: 68 BPM | TEMPERATURE: 98.7 F | SYSTOLIC BLOOD PRESSURE: 145 MMHG | RESPIRATION RATE: 18 BRPM | WEIGHT: 151 LBS

## 2022-11-27 DIAGNOSIS — R10.84 GENERALIZED ABDOMINAL PAIN: Primary | ICD-10-CM

## 2022-11-27 DIAGNOSIS — K59.00 CONSTIPATION, UNSPECIFIED CONSTIPATION TYPE: ICD-10-CM

## 2022-11-27 LAB
ALBUMIN SERPL BCP-MCNC: 4 G/DL (ref 3.5–5)
ALP SERPL-CCNC: 103 U/L (ref 43–122)
ALT SERPL W P-5'-P-CCNC: 18 U/L
ANION GAP SERPL CALCULATED.3IONS-SCNC: 4 MMOL/L (ref 5–14)
AST SERPL W P-5'-P-CCNC: 22 U/L (ref 14–36)
BACTERIA UR QL AUTO: ABNORMAL /HPF
BASOPHILS # BLD AUTO: 0.04 THOUSANDS/ÂΜL (ref 0–0.1)
BASOPHILS NFR BLD AUTO: 1 % (ref 0–1)
BILIRUB SERPL-MCNC: 0.36 MG/DL (ref 0.2–1)
BILIRUB UR QL STRIP: NEGATIVE
BUN SERPL-MCNC: 26 MG/DL (ref 5–25)
CALCIUM SERPL-MCNC: 8.7 MG/DL (ref 8.4–10.2)
CHLORIDE SERPL-SCNC: 108 MMOL/L (ref 96–108)
CLARITY UR: ABNORMAL
CO2 SERPL-SCNC: 29 MMOL/L (ref 21–32)
COLOR UR: YELLOW
CREAT SERPL-MCNC: 0.92 MG/DL (ref 0.6–1.2)
EOSINOPHIL # BLD AUTO: 0.55 THOUSAND/ÂΜL (ref 0–0.61)
EOSINOPHIL NFR BLD AUTO: 6 % (ref 0–6)
ERYTHROCYTE [DISTWIDTH] IN BLOOD BY AUTOMATED COUNT: 12.5 % (ref 11.6–15.1)
GFR SERPL CREATININE-BSD FRML MDRD: 72 ML/MIN/1.73SQ M
GLUCOSE SERPL-MCNC: 103 MG/DL (ref 70–99)
GLUCOSE UR STRIP-MCNC: NEGATIVE MG/DL
HCT VFR BLD AUTO: 41.5 % (ref 34.8–46.1)
HGB BLD-MCNC: 13.2 G/DL (ref 11.5–15.4)
HGB UR QL STRIP.AUTO: 150
IMM GRANULOCYTES # BLD AUTO: 0.01 THOUSAND/UL (ref 0–0.2)
IMM GRANULOCYTES NFR BLD AUTO: 0 % (ref 0–2)
KETONES UR STRIP-MCNC: NEGATIVE MG/DL
LEUKOCYTE ESTERASE UR QL STRIP: 100
LIPASE SERPL-CCNC: 204 U/L (ref 23–300)
LYMPHOCYTES # BLD AUTO: 2.55 THOUSANDS/ÂΜL (ref 0.6–4.47)
LYMPHOCYTES NFR BLD AUTO: 30 % (ref 14–44)
MCH RBC QN AUTO: 30.7 PG (ref 26.8–34.3)
MCHC RBC AUTO-ENTMCNC: 31.8 G/DL (ref 31.4–37.4)
MCV RBC AUTO: 97 FL (ref 82–98)
MONOCYTES # BLD AUTO: 0.74 THOUSAND/ÂΜL (ref 0.17–1.22)
MONOCYTES NFR BLD AUTO: 9 % (ref 4–12)
MUCOUS THREADS UR QL AUTO: ABNORMAL
NEUTROPHILS # BLD AUTO: 4.73 THOUSANDS/ÂΜL (ref 1.85–7.62)
NEUTS SEG NFR BLD AUTO: 54 % (ref 43–75)
NITRITE UR QL STRIP: NEGATIVE
NON-SQ EPI CELLS URNS QL MICRO: ABNORMAL /HPF
NRBC BLD AUTO-RTO: 0 /100 WBCS
PH UR STRIP.AUTO: 6 [PH]
PLATELET # BLD AUTO: 305 THOUSANDS/UL (ref 149–390)
PMV BLD AUTO: 10.5 FL (ref 8.9–12.7)
POTASSIUM SERPL-SCNC: 3.8 MMOL/L (ref 3.5–5.3)
PROT SERPL-MCNC: 7.5 G/DL (ref 6.4–8.4)
PROT UR STRIP-MCNC: ABNORMAL MG/DL
RBC # BLD AUTO: 4.3 MILLION/UL (ref 3.81–5.12)
RBC #/AREA URNS AUTO: ABNORMAL /HPF
SODIUM SERPL-SCNC: 141 MMOL/L (ref 135–147)
SP GR UR STRIP.AUTO: 1.02 (ref 1–1.04)
UROBILINOGEN UA: NEGATIVE MG/DL
WBC # BLD AUTO: 8.62 THOUSAND/UL (ref 4.31–10.16)
WBC #/AREA URNS AUTO: ABNORMAL /HPF

## 2022-11-27 RX ORDER — POLYETHYLENE GLYCOL 3350 17 G/17G
17 POWDER, FOR SOLUTION ORAL DAILY
Qty: 119 G | Refills: 0 | Status: SHIPPED | OUTPATIENT
Start: 2022-11-27 | End: 2022-12-04

## 2022-11-27 RX ORDER — DICYCLOMINE HCL 20 MG
20 TABLET ORAL ONCE
Status: COMPLETED | OUTPATIENT
Start: 2022-11-27 | End: 2022-11-27

## 2022-11-27 RX ORDER — MAGNESIUM HYDROXIDE/ALUMINUM HYDROXICE/SIMETHICONE 120; 1200; 1200 MG/30ML; MG/30ML; MG/30ML
30 SUSPENSION ORAL ONCE
Status: COMPLETED | OUTPATIENT
Start: 2022-11-27 | End: 2022-11-27

## 2022-11-27 RX ORDER — KETOROLAC TROMETHAMINE 30 MG/ML
30 INJECTION, SOLUTION INTRAMUSCULAR; INTRAVENOUS ONCE
Status: COMPLETED | OUTPATIENT
Start: 2022-11-27 | End: 2022-11-27

## 2022-11-27 RX ORDER — DICYCLOMINE HYDROCHLORIDE 10 MG/1
10 CAPSULE ORAL
Qty: 28 CAPSULE | Refills: 0 | Status: SHIPPED | OUTPATIENT
Start: 2022-11-27 | End: 2022-12-04

## 2022-11-27 RX ORDER — FAMOTIDINE 10 MG/ML
20 INJECTION, SOLUTION INTRAVENOUS ONCE
Status: COMPLETED | OUTPATIENT
Start: 2022-11-27 | End: 2022-11-27

## 2022-11-27 RX ORDER — DICYCLOMINE HYDROCHLORIDE 10 MG/1
10 CAPSULE ORAL
Qty: 28 CAPSULE | Refills: 0 | Status: SHIPPED | OUTPATIENT
Start: 2022-11-27 | End: 2022-11-27 | Stop reason: SDUPTHER

## 2022-11-27 RX ORDER — ONDANSETRON 2 MG/ML
4 INJECTION INTRAMUSCULAR; INTRAVENOUS ONCE
Status: COMPLETED | OUTPATIENT
Start: 2022-11-27 | End: 2022-11-27

## 2022-11-27 RX ADMIN — ALUMINUM HYDROXIDE, MAGNESIUM HYDROXIDE, AND SIMETHICONE 30 ML: 200; 200; 20 SUSPENSION ORAL at 20:11

## 2022-11-27 RX ADMIN — SODIUM CHLORIDE 1000 ML: 0.9 INJECTION, SOLUTION INTRAVENOUS at 20:10

## 2022-11-27 RX ADMIN — DICYCLOMINE HYDROCHLORIDE 20 MG: 20 TABLET ORAL at 20:16

## 2022-11-27 RX ADMIN — FAMOTIDINE 20 MG: 10 INJECTION INTRAVENOUS at 20:16

## 2022-11-27 RX ADMIN — IOHEXOL 100 ML: 350 INJECTION, SOLUTION INTRAVENOUS at 20:35

## 2022-11-27 RX ADMIN — KETOROLAC TROMETHAMINE 30 MG: 30 INJECTION, SOLUTION INTRAMUSCULAR; INTRAVENOUS at 20:16

## 2022-11-27 RX ADMIN — ONDANSETRON 4 MG: 2 INJECTION INTRAMUSCULAR; INTRAVENOUS at 20:16

## 2022-11-28 NOTE — ED ATTENDING ATTESTATION
11/27/2022  IRodolfo MD, saw and evaluated the patient  I have discussed the patient with the resident/non-physician practitioner and agree with the resident's/non-physician practitioner's findings, Plan of Care, and MDM as documented in the resident's/non-physician practitioner's note, except where noted  All available labs and Radiology studies were reviewed  I was present for key portions of any procedure(s) performed by the resident/non-physician practitioner and I was immediately available to provide assistance  At this point I agree with the current assessment done in the Emergency Department  I have conducted an independent evaluation of this patient a history and physical is as follows:    ED Course     Patien tis a 51-year-old female with 4 days of generalized abdominal pain and bloating  Vomited x 1 today  Last BM this evening  Also states urgency and frequency with urinating  No f/s/c  No meds taken  vss  NCAT  HEENT wnl  Heart rrr  Lungs CTA b/l  Abd, +distension  No focal ttp  No guarding no rebound  No CVA ttp b/l  Skin wdi    Plan as discussed with resident  ,     Critical Care Time  Procedures

## 2022-11-28 NOTE — ED PROVIDER NOTES
History  Chief Complaint   Patient presents with   • Abdominal Pain     Via , pt reports abdominal pain, difficulty urinating, N/V for 3 days   denies fevers, diarrhea      HPI    Prior to Admission Medications   Prescriptions Last Dose Informant Patient Reported? Taking? albuterol (PROVENTIL HFA,VENTOLIN HFA) 90 mcg/act inhaler   No No   Sig: Inhale 2 puffs every 6 (six) hours as needed for wheezing   dicyclomine (BENTYL) 20 mg tablet   No No   Sig: Take 1 tablet (20 mg total) by mouth 2 (two) times a day as needed (abdominal pain)   ibuprofen (MOTRIN) 400 mg tablet   No No   Sig: Take 1 tablet (400 mg total) by mouth every 6 (six) hours as needed for mild pain (Body aches or fever) for up to 7 days   lidocaine (LIDODERM) 5 %   No No   Sig: Apply 1 patch topically daily Remove & Discard patch within 12 hours or as directed by MD   meloxicam (Mobic) 15 mg tablet   No No   Sig: Take 1 tablet (15 mg total) by mouth daily   methocarbamol (ROBAXIN) 500 mg tablet   No No   Sig: Take 1 tablet (500 mg total) by mouth 2 (two) times a day   naproxen (NAPROSYN) 500 mg tablet   No No   Sig: Take 1 tablet (500 mg total) by mouth every 12 (twelve) hours as needed for mild pain or moderate pain   Patient not taking: Reported on 1/18/2021   omeprazole (PriLOSEC) 20 mg delayed release capsule   No No   Sig: Take 1 capsule (20 mg total) by mouth daily   ondansetron (ZOFRAN-ODT) 4 mg disintegrating tablet   No No   Sig: Take 1 tablet (4 mg total) by mouth every 8 (eight) hours as needed for nausea or vomiting for up to 12 doses   sucralfate (CARAFATE) 1 g tablet   No No   Sig: Take 1 tablet (1 g total) by mouth 4 (four) times a day      Facility-Administered Medications: None       Past Medical History:   Diagnosis Date   • Asthma    • Colitis        Past Surgical History:   Procedure Laterality Date   • ABDOMINAL SURGERY     • HYSTERECTOMY         History reviewed  No pertinent family history    I have reviewed and agree with the history as documented      E-Cigarette/Vaping   • E-Cigarette Use Never User      E-Cigarette/Vaping Substances   • Nicotine No    • THC No    • CBD No    • Flavoring No    • Other No    • Unknown No      Social History     Tobacco Use   • Smoking status: Never   • Smokeless tobacco: Never   Vaping Use   • Vaping Use: Never used   Substance Use Topics   • Alcohol use: Not Currently   • Drug use: No       Review of Systems    Physical Exam  Physical Exam    Vital Signs  ED Triage Vitals   Temperature Pulse Respirations Blood Pressure SpO2   11/27/22 1911 11/27/22 1911 11/27/22 1911 11/27/22 1911 11/27/22 1911   98 7 °F (37 1 °C) 82 18 136/94 100 %      Temp Source Heart Rate Source Patient Position - Orthostatic VS BP Location FiO2 (%)   11/27/22 1911 11/27/22 1911 11/27/22 1911 11/27/22 1911 --   Tympanic Monitor Sitting Left arm       Pain Score       11/27/22 2016       10 - Worst Possible Pain           Vitals:    11/27/22 1911 11/27/22 2134   BP: 136/94 145/84   Pulse: 82 68   Patient Position - Orthostatic VS: Sitting Sitting         Visual Acuity      ED Medications  Medications   aluminum-magnesium hydroxide-simethicone (MYLANTA) oral suspension 30 mL (30 mL Oral Given 11/27/22 2011)   Famotidine (PF) (PEPCID) injection 20 mg (20 mg Intravenous Given 11/27/22 2016)   ketorolac (TORADOL) injection 30 mg (30 mg Intravenous Given 11/27/22 2016)   ondansetron (ZOFRAN) injection 4 mg (4 mg Intravenous Given 11/27/22 2016)   dicyclomine (BENTYL) tablet 20 mg (20 mg Oral Given 11/27/22 2016)   sodium chloride 0 9 % bolus 1,000 mL (0 mL Intravenous Stopped 11/27/22 2135)   iohexol (OMNIPAQUE) 350 MG/ML injection (SINGLE-DOSE) 100 mL (100 mL Intravenous Given 11/27/22 2035)       Diagnostic Studies  Results Reviewed     Procedure Component Value Units Date/Time    Lipase [[id number]]  (Normal) Collected: 11/27/22 1956    Lab Status: Final result Specimen: Blood from Line, Venous Updated: 11/27/22 2019 Lipase 204 u/L     Narrative:      Hemolysis    Comprehensive metabolic panel [[id number]]  (Abnormal) Collected: 11/27/22 1956    Lab Status: Final result Specimen: Blood from Line, Venous Updated: 11/27/22 2019     Sodium 141 mmol/L      Potassium 3 8 mmol/L      Chloride 108 mmol/L      CO2 29 mmol/L      ANION GAP 4 mmol/L      BUN 26 mg/dL      Creatinine 0 92 mg/dL      Glucose 103 mg/dL      Calcium 8 7 mg/dL      AST 22 U/L      ALT 18 U/L      Alkaline Phosphatase 103 U/L      Total Protein 7 5 g/dL      Albumin 4 0 g/dL      Total Bilirubin 0 36 mg/dL      eGFR 72 ml/min/1 73sq m     Narrative:      Hemolysis  [employer] guidelines for Chronic Kidney Disease (CKD):   •  Stage 1 with normal or high GFR (GFR > 90 mL/min/1 73 square meters)  •  Stage 2 Mild CKD (GFR = 60-89 mL/min/1 73 square meters)  •  Stage 3A Moderate CKD (GFR = 45-59 mL/min/1 73 square meters)  •  Stage 3B Moderate CKD (GFR = 30-44 mL/min/1 73 square meters)  •  Stage 4 Severe CKD (GFR = 15-29 mL/min/1 73 square meters)  •  Stage 5 End Stage CKD (GFR <15 mL/min/1 73 square meters)  Note: GFR calculation is accurate only with a steady state creatinine    CBC and differential [[id number]] Collected: 11/27/22 1956    Lab Status: Final result Specimen: Blood from Line, Venous Updated: 11/27/22 2004     WBC 8 62 Thousand/uL      RBC 4 30 Million/uL      Hemoglobin 13 2 g/dL      Hematocrit 41 5 %      MCV 97 fL      MCH 30 7 pg      MCHC 31 8 g/dL      RDW 12 5 %      MPV 10 5 fL      Platelets 352 Thousands/uL      nRBC 0 /100 WBCs      Neutrophils Relative 54 %      Immat GRANS % 0 %      Lymphocytes Relative 30 %      Monocytes Relative 9 %      Eosinophils Relative 6 %      Basophils Relative 1 %      Neutrophils Absolute 4 73 Thousands/µL      Immature Grans Absolute 0 01 Thousand/uL      Lymphocytes Absolute 2 55 Thousands/µL      Monocytes Absolute 0 74 Thousand/µL      Eosinophils Absolute 0 55 Thousand/µL Basophils Absolute 0 04 Thousands/µL     Urine Microscopic [[id number]]  (Abnormal) Collected: 11/27/22 1937    Lab Status: Final result Specimen: Urine, Clean Catch Updated: 11/27/22 1957     RBC, UA 2-4 /hpf      WBC, UA 2-4 /hpf      Epithelial Cells Occasional /hpf      Bacteria, UA Occasional /hpf      MUCUS THREADS Occasional    UA w Reflex to Microscopic w Reflex to Culture [[id number]]  (Abnormal) Collected: 11/27/22 1937    Lab Status: Final result Specimen: Urine, Clean Catch Updated: 11/27/22 1947     Color, UA Yellow     Clarity, UA Slightly Cloudy     Specific [place], UA 1 020     pH, UA 6 0     Leukocytes,  0     Nitrite, UA Negative     Protein, UA 30 (1+) mg/dl      Glucose, UA Negative mg/dl      Ketones, UA Negative mg/dl      Bilirubin, UA Negative     Occult Blood,  0     UROBILINOGEN UA Negative mg/dL                  CT abdomen pelvis with contrast   ED Interpretation by Jennifer Fiore MD (11/27 2127)   FINDINGS:     ABDOMEN     LOWER CHEST:  No clinically significant abnormality identified in the visualized lower chest      LIVER/BILIARY TREE:  Unremarkable      GALLBLADDER:  Contracted  No calcified gallstones  No pericholecystic inflammatory change      SPLEEN:  Unremarkable      PANCREAS:  Unremarkable      ADRENAL GLANDS:  Unremarkable      KIDNEYS/URETERS:  No hydronephrosis or urinary tract calculus  One or more sharply circumscribed subcentimeter renal hypodensities are present, too small to accurately characterize, and statistically most likely benign findings  According to recent   literature (Radiology 2019) no further workup of these findings is recommended  Right-sided extrarenal pelvis      STOMACH AND BOWEL:  Unremarkable      APPENDIX:  A normal appendix was visualized      ABDOMINOPELVIC CAVITY:  No ascites  No pneumoperitoneum    No lymphadenopathy      VESSELS:  Unremarkable for patient's age      PELVIS     REPRODUCTIVE ORGANS:  Unremarkable for patient's age         URINARY BLADDER:  Unremarkable      ABDOMINAL WALL/INGUINAL REGIONS:  There is a small fat-containing umbilical hernia      OSSEOUS STRUCTURES:  No acute fracture or destructive osseous lesion      IMPRESSION:     No acute pathology            Workstation performed: [id number]      Final Result by Nanette Atkins MD (11/27 2115)      No acute pathology  Workstation performed: [id number]                    Procedures  Procedures         ED Course                               SBIRT 20yo+    Flowsheet Row Most Recent Value   SBIRT (25 yo +)    In order to provide better care to our patients, we are screening all of our patients for alcohol and drug use  Would it be okay to ask you these screening questions? No Filed at: 11/27/2022 1931                    MDM    Disposition  Final diagnoses:   Generalized abdominal pain   Constipation, unspecified constipation type     Time reflects when diagnosis was documented in both MDM as applicable and the Disposition within this note     Time User Action Codes Description Comment    11/27/2022  9:24 PM Hiral Blow Add [R10 84] Generalized abdominal pain     11/27/2022  9:24 PM Hiral Blow Add [K59 00] Constipation, unspecified constipation type       ED Disposition     ED Disposition   Discharge    Condition   Stable    Date/Time   Sun Nov 27, 2022 [address] [place] [address] discharge to home/self care                 Follow-up Information     Follow up With Specialties Details Why Contact Info Additional Information    Krystal Johnson MD Family Medicine Schedule an appointment as soon as possible for a visit  As needed [address] [phone]  [address] [place] [address]  [facility] Emergency Medicine  As needed, If symptoms worsen [address] [phone]  [facility] Emergency Department          Discharge Medication List as of 11/27/2022  9:28 PM      START taking these medications    Details   polyethylene glycol (MIRALAX) 17 g packet Take 17 g by mouth daily for 7 days, Starting Sun 11/27/2022, Until Sun 12/4/2022, Normal         CONTINUE these medications which have CHANGED    Details   dicyclomine (BENTYL) 10 mg capsule Take 1 capsule (10 mg total) by mouth 4 (four) times a day (before meals and at bedtime) for 7 days, Starting Sun 11/27/2022, Until Sun 12/4/2022, Normal         CONTINUE these medications which have NOT CHANGED    Details   albuterol (PROVENTIL HFA,VENTOLIN HFA) 90 mcg/act inhaler Inhale 2 puffs every 6 (six) hours as needed for wheezing, Starting Tue 8/9/2022, Normal      dicyclomine (BENTYL) 20 mg tablet Take 1 tablet (20 mg total) by mouth 2 (two) times a day as needed (abdominal pain), Starting Mon 1/18/2021, Normal      ibuprofen (MOTRIN) 400 mg tablet Take 1 tablet (400 mg total) by mouth every 6 (six) hours as needed for mild pain (Body aches or fever) for up to 7 days, Starting Tue 8/9/2022, Until Tue 8/16/2022 at 2359, Normal      lidocaine (LIDODERM) 5 % Apply 1 patch topically daily Remove & Discard patch within 12 hours or as directed by MD, Starting Fri 5/14/2021, Normal      meloxicam (Mobic) 15 mg tablet Take 1 tablet (15 mg total) by mouth daily, Starting Mon 2/21/2022, Normal      methocarbamol (ROBAXIN) 500 mg tablet Take 1 tablet (500 mg total) by mouth 2 (two) times a day, Starting Fri 5/14/2021, Normal      naproxen (NAPROSYN) 500 mg tablet Take 1 tablet (500 mg total) by mouth every 12 (twelve) hours as needed for mild pain or moderate pain, Starting Fri 2/28/2020, Print      omeprazole (PriLOSEC) 20 mg delayed release capsule Take 1 capsule (20 mg total) by mouth daily, Starting Fri 7/30/2021, Print      ondansetron (ZOFRAN-ODT) 4 mg disintegrating tablet Take 1 tablet (4 mg total) by mouth every 8 (eight) hours as needed for nausea or vomiting for up to 12 doses, Starting Tue 3/8/2022, Normal      sucralfate (CARAFATE) 1 g tablet Take 1 tablet (1 g total) by mouth 4 (four) times a day, Starting Fri 7/30/2021, Print             No discharge procedures on file      PDMP Review     None          ED Provider  Electronically Signed by           Vi Kendrick MD  11/27/22 9930

## 2022-11-28 NOTE — ED PROVIDER NOTES
History  Chief Complaint   Patient presents with   • Abdominal Pain     Via , pt reports abdominal pain, difficulty urinating, N/V for 3 days   denies fevers, diarrhea      Juliette Limon comes emergency department accompanied by her daughter-in-law after experiencing 4 days of persistent generalized abdominal discomfort  Patient states that the pain is most exacerbated when she attempts to urinate and will feel any increase in the lower quadrants of her abdomen  She states that the urination does not burn and she does not have any discharge or blood in her urinary stream   Patient denies any vaginal bleeding or vaginal pain  She states that she has not taken any medications to help with the symptoms  She states that these pains are also exacerbated when she attempts to move around  She states that she has not had any difficulty with passing bowel movements with her last bowel movement being at approximately 5:00 p m  on 11/27/2022  Patient does describe that she had 1 episode of emesis today (11/27/2022) and her pain is been associated with persistent nausea  While at rest and during interview in the emergency department, patient describes that the majority of her discomfort is located in the epigastric region and is also associated on the bilateral lateral sides of her abdomen  Patient denies any back pain  Patient states that she has had in the past history of diverticulitis and kidney stones  She states that days presentation is different in feeling when compared to those previous episodes  Patient denies any fevers, chills, chest pains, or shortness of breath        History provided by:  Patient   used: No    Abdominal Pain  Pain location:  Generalized  Pain quality: sharp and stabbing    Pain radiates to:  Does not radiate  Pain severity:  Moderate  Onset quality:  Gradual  Duration:  4 days  Timing:  Constant  Progression:  Unchanged  Chronicity:  New  Context: not alcohol use, not awakening from sleep, not diet changes, not eating, not laxative use, not medication withdrawal, not previous surgeries, not recent illness, not recent sexual activity, not recent travel, not retching, not sick contacts, not suspicious food intake and not trauma    Relieved by:  Nothing  Worsened by: Movement, urination, position changes and bowel movements  Ineffective treatments:  None tried  Associated symptoms: chills, diarrhea, dysuria, nausea and vomiting    Associated symptoms: no anorexia, no belching, no chest pain, no constipation, no cough, no fever, no flatus, no hematemesis, no hematochezia, no hematuria, no melena, no shortness of breath, no sore throat, no vaginal bleeding and no vaginal discharge    Risk factors: no alcohol abuse and not elderly        Prior to Admission Medications   Prescriptions Last Dose Informant Patient Reported? Taking?    albuterol (PROVENTIL HFA,VENTOLIN HFA) 90 mcg/act inhaler   No No   Sig: Inhale 2 puffs every 6 (six) hours as needed for wheezing   dicyclomine (BENTYL) 20 mg tablet   No No   Sig: Take 1 tablet (20 mg total) by mouth 2 (two) times a day as needed (abdominal pain)   ibuprofen (MOTRIN) 400 mg tablet   No No   Sig: Take 1 tablet (400 mg total) by mouth every 6 (six) hours as needed for mild pain (Body aches or fever) for up to 7 days   lidocaine (LIDODERM) 5 %   No No   Sig: Apply 1 patch topically daily Remove & Discard patch within 12 hours or as directed by MD   meloxicam (Mobic) 15 mg tablet   No No   Sig: Take 1 tablet (15 mg total) by mouth daily   methocarbamol (ROBAXIN) 500 mg tablet   No No   Sig: Take 1 tablet (500 mg total) by mouth 2 (two) times a day   naproxen (NAPROSYN) 500 mg tablet   No No   Sig: Take 1 tablet (500 mg total) by mouth every 12 (twelve) hours as needed for mild pain or moderate pain   Patient not taking: Reported on 1/18/2021   omeprazole (PriLOSEC) 20 mg delayed release capsule   No No   Sig: Take 1 capsule (20 mg total) by mouth daily   ondansetron (ZOFRAN-ODT) 4 mg disintegrating tablet   No No   Sig: Take 1 tablet (4 mg total) by mouth every 8 (eight) hours as needed for nausea or vomiting for up to 12 doses   sucralfate (CARAFATE) 1 g tablet   No No   Sig: Take 1 tablet (1 g total) by mouth 4 (four) times a day      Facility-Administered Medications: None       Past Medical History:   Diagnosis Date   • Asthma    • Colitis        Past Surgical History:   Procedure Laterality Date   • ABDOMINAL SURGERY     • HYSTERECTOMY         History reviewed  No pertinent family history  I have reviewed and agree with the history as documented  E-Cigarette/Vaping   • E-Cigarette Use Never User      E-Cigarette/Vaping Substances   • Nicotine No    • THC No    • CBD No    • Flavoring No    • Other No    • Unknown No      Social History     Tobacco Use   • Smoking status: Never   • Smokeless tobacco: Never   Vaping Use   • Vaping Use: Never used   Substance Use Topics   • Alcohol use: Not Currently   • Drug use: No        Review of Systems   Constitutional: Positive for chills  Negative for fever  HENT: Negative for ear pain and sore throat  Eyes: Negative for pain and visual disturbance  Respiratory: Negative for cough and shortness of breath  Cardiovascular: Negative for chest pain and palpitations  Gastrointestinal: Positive for abdominal pain, diarrhea, nausea and vomiting  Negative for anorexia, constipation, flatus, hematemesis, hematochezia and melena  Genitourinary: Positive for dysuria  Negative for hematuria, vaginal bleeding and vaginal discharge  Musculoskeletal: Negative for arthralgias and back pain  Skin: Negative for color change and rash  Neurological: Negative for seizures and syncope  All other systems reviewed and are negative        Physical Exam  ED Triage Vitals   Temperature Pulse Respirations Blood Pressure SpO2   11/27/22 1911 11/27/22 1911 11/27/22 1911 11/27/22 1911 11/27/22 1911   98 7 °F (37 1 °C) 82 18 136/94 100 %      Temp Source Heart Rate Source Patient Position - Orthostatic VS BP Location FiO2 (%)   11/27/22 1911 11/27/22 1911 11/27/22 1911 11/27/22 1911 --   Tympanic Monitor Sitting Left arm       Pain Score       11/27/22 2016       10 - Worst Possible Pain             Orthostatic Vital Signs  Vitals:    11/27/22 1911 11/27/22 2134   BP: 136/94 145/84   Pulse: 82 68   Patient Position - Orthostatic VS: Sitting Sitting       Physical Exam  Vitals and nursing note reviewed  Constitutional:       Appearance: She is well-developed  She is not diaphoretic  HENT:      Head: Normocephalic and atraumatic  Mouth/Throat:      Mouth: Mucous membranes are moist    Eyes:      Extraocular Movements: Extraocular movements intact  Conjunctiva/sclera: Conjunctivae normal    Cardiovascular:      Rate and Rhythm: Normal rate and regular rhythm  Heart sounds: Normal heart sounds  No murmur heard  Pulmonary:      Effort: Pulmonary effort is normal  No respiratory distress  Breath sounds: Normal breath sounds  No wheezing or rhonchi  Chest:      Chest wall: No tenderness  Abdominal:      General: Abdomen is protuberant  Bowel sounds are normal  There is distension  Palpations: Abdomen is soft  There is no mass  Tenderness: There is generalized abdominal tenderness  There is no right CVA tenderness, left CVA tenderness, guarding or rebound  Negative signs include Gonzalez's sign and McBurney's sign  Hernia: No hernia is present  Comments: Tympanic on percussion  Genitourinary:     Adnexa: Right adnexa normal and left adnexa normal    Musculoskeletal:         General: No swelling  Cervical back: Neck supple  Skin:     General: Skin is warm and dry  Capillary Refill: Capillary refill takes less than 2 seconds  Neurological:      General: No focal deficit present  Mental Status: She is alert and oriented to person, place, and time     Psychiatric: Mood and Affect: Mood normal          ED Medications  Medications   aluminum-magnesium hydroxide-simethicone (MYLANTA) oral suspension 30 mL (30 mL Oral Given 11/27/22 2011)   Famotidine (PF) (PEPCID) injection 20 mg (20 mg Intravenous Given 11/27/22 2016)   ketorolac (TORADOL) injection 30 mg (30 mg Intravenous Given 11/27/22 2016)   ondansetron (ZOFRAN) injection 4 mg (4 mg Intravenous Given 11/27/22 2016)   dicyclomine (BENTYL) tablet 20 mg (20 mg Oral Given 11/27/22 2016)   sodium chloride 0 9 % bolus 1,000 mL (0 mL Intravenous Stopped 11/27/22 2135)   iohexol (OMNIPAQUE) 350 MG/ML injection (SINGLE-DOSE) 100 mL (100 mL Intravenous Given 11/27/22 2035)       Diagnostic Studies  Results Reviewed     Procedure Component Value Units Date/Time    Lipase [[id number]]  (Normal) Collected: 11/27/22 1956    Lab Status: Final result Specimen: Blood from Line, Venous Updated: 11/27/22 2019     Lipase 204 u/L     Narrative:      Hemolysis    Comprehensive metabolic panel [[id number]]  (Abnormal) Collected: 11/27/22 1956    Lab Status: Final result Specimen: Blood from Line, Venous Updated: 11/27/22 2019     Sodium 141 mmol/L      Potassium 3 8 mmol/L      Chloride 108 mmol/L      CO2 29 mmol/L      ANION GAP 4 mmol/L      BUN 26 mg/dL      Creatinine 0 92 mg/dL      Glucose 103 mg/dL      Calcium 8 7 mg/dL      AST 22 U/L      ALT 18 U/L      Alkaline Phosphatase 103 U/L      Total Protein 7 5 g/dL      Albumin 4 0 g/dL      Total Bilirubin 0 36 mg/dL      eGFR 72 ml/min/1 73sq m     Narrative:      Hemolysis  National Kid[employer] Disease Foundation guidelines for Chronic Kidney Disease (CKD):   •  Stage 1 with normal or high GFR (GFR > 90 mL/min/1 73 square meters)  •  Stage 2 Mild CKD (GFR = 60-89 mL/min/1 73 square meters)  •  Stage 3A Moderate CKD (GFR = 45-59 mL/min/1 73 square meters)  •  Stage 3B Moderate CKD (GFR = 30-44 mL/min/1 73 square meters)  •  Stage 4 Severe CKD (GFR = 15-29 mL/min/1 73 square meters)  • Stage 5 End Stage CKD (GFR <15 mL/min/1 73 square meters)  Note: GFR calculation is accurate only with a steady state creatinine    CBC and differential [[id number]] Collected: 11/27/22 1956    Lab Status: Final result Specimen: Blood from Line, Venous Updated: 11/27/22 2004     WBC 8 62 Thousand/uL      RBC 4 30 Million/uL      Hemoglobin 13 2 g/dL      Hematocrit 41 5 %      MCV 97 fL      MCH 30 7 pg      MCHC 31 8 g/dL      RDW 12 5 %      MPV 10 5 fL      Platelets 184 Thousands/uL      nRBC 0 /100 WBCs      Neutrophils Relative 54 %      Immat GRANS % 0 %      Lymphocytes Relative 30 %      Monocytes Relative 9 %      Eosinophils Relative 6 %      Basophils Relative 1 %      Neutrophils Absolute 4 73 Thousands/µL      Immature Grans Absolute 0 01 Thousand/uL      Lymphocytes Absolute 2 55 Thousands/µL      Monocytes Absolute 0 74 Thousand/µL      Eosinophils Absolute 0 55 Thousand/µL      Basophils Absolute 0 04 Thousands/µL     Urine Microscopic [[id number]]  (Abnormal) Collected: 11/27/22 1937    Lab Status: Final result Specimen: Urine, Clean Catch Updated: 11/27/22 1957     RBC, UA 2-4 /hpf      WBC, UA 2-4 /hpf      Epithelial Cells Occasional /hpf      Bacteria, UA Occasional /hpf      MUCUS THREADS Occasional    UA w Reflex to Microscopic w Reflex to Culture [[id number]]  (Abnormal) Collected: 11/27/22 1937    Lab Status: Final result Specimen: Urine, Clean Catch Updated: 11/27/22 1947     Color, UA Yellow     Clarity, UA Slightly Cloudy     Specific [place], UA 1 020     pH, UA 6 0     Leukocytes,  0     Nitrite, UA Negative     Protein, UA 30 (1+) mg/dl      Glucose, UA Negative mg/dl      Ketones, UA Negative mg/dl      Bilirubin, UA Negative     Occult Blood,  0     UROBILINOGEN UA Negative mg/dL                  CT abdomen pelvis with contrast   ED Interpretation by Hans Caro MD (11/27 2127)   FINDINGS:     ABDOMEN     LOWER CHEST:  No clinically significant abnormality identified in the visualized lower chest      LIVER/BILIARY TREE:  Unremarkable      GALLBLADDER:  Contracted  No calcified gallstones  No pericholecystic inflammatory change      SPLEEN:  Unremarkable      PANCREAS:  Unremarkable      ADRENAL GLANDS:  Unremarkable      KIDNEYS/URETERS:  No hydronephrosis or urinary tract calculus  One or more sharply circumscribed subcentimeter renal hypodensities are present, too small to accurately characterize, and statistically most likely benign findings  According to recent   literature (Radiology 2019) no further workup of these findings is recommended  Right-sided extrarenal pelvis      STOMACH AND BOWEL:  Unremarkable      APPENDIX:  A normal appendix was visualized      ABDOMINOPELVIC CAVITY:  No ascites  No pneumoperitoneum  No lymphadenopathy      VESSELS:  Unremarkable for patient's age      PELVIS     REPRODUCTIVE ORGANS:  Unremarkable for patient's age         URINARY BLADDER:  Unremarkable      ABDOMINAL WALL/INGUINAL REGIONS:  There is a small fat-containing umbilical hernia      OSSEOUS STRUCTURES:  No acute fracture or destructive osseous lesion      IMPRESSION:     No acute pathology            Workstation performed: [id number]      Final Result by Darius Thomas MD (11/27 2115)      No acute pathology  Workstation performed: [id number]               Procedures  Procedures      ED Course                             SBIRT 20yo+    Flowsheet Row Most Recent Value   SBIRT (25 yo +)    In order to provide better care to our patients, we are screening all of our patients for alcohol and drug use  Would it be okay to ask you these screening questions?  No Filed at: 11/27/2022 1931                MDM  Number of Diagnoses or Management Options  Constipation, unspecified constipation type: new and requires workup  Generalized abdominal pain: established and worsening  Diagnosis management comments: Radha Howard comes to the emergency department after having persistent abdominal discomfort associated with nausea as well as urinary difficulty for the past 4 days  Patient notes that she is significantly distended and bloated during her symptoms  Patient has been passing flatus and having bowel movements within the last 24 hours  Based off initial presenting complaints and diffuse tenderness on abdominal palpation, initial laboratory studies and imaging studies were conducted  CBC was unremarkable  CMP was unremarkable  Mild elevation in BUN which could be attributed to a mild amount of dehydration  Lipase was unremarkable  Urinalysis was unremarkable  Microscopic evaluation was notable for only the presence of scant bacteria and epithelial cells  No findings consistent with a urinary tract infection  Based off of diffuse tenderness as well as bloating, CT of the abdomen and pelvis with contrast was initiated  CT imaging demonstrated no acute findings in the presence of a moderate amount of stool in her GI system  For symptomatic relief, patient was provided with multiple GI medications via IV formulation to assist with symptomatology  After reassessment of the patient after administration of medications, patient endorsed mild improvement in her symptoms  She states that the pain is still there but it is significantly improved  Patient was counseled that there was no acute findings on either laboratory studies, imaging studies or physical examination findings that would warrant for her to stay in the emergency department/be admitted to the hospital   She was encouraged to follow up with her PCP and was provided with medications to help with symptomatic control of her bowel movements (MiraLax) as well as abdominal pain relief (Bentyl)  Patient was instructed to take these medications as needed for improvement in symptoms      Opportunity to ask questions were presented to both family and the patient at the bedside in which all questions were answered to the best of ability of this provider  Was encouraged that she should follow up with primary care provider as soon as possible and further discussion of potential evaluation by GI given the recurrence of this bloating symptoms that she is experiencing  Both patient and family member expressed understanding at the bedside  Disposition:  Discharged home with utilization of medications for symptomatic control of abdominal bloating and discomfort, close PCP follow-up, strict return precautions discussed at bedside  Amount and/or Complexity of Data Reviewed  Clinical lab tests: ordered and reviewed  Obtain history from someone other than the patient: yes  Review and summarize past medical records: yes  Discuss the patient with other providers: yes  Independent visualization of images, tracings, or specimens: yes    Risk of Complications, Morbidity, and/or Mortality  Presenting problems: moderate  Diagnostic procedures: moderate  Management options: moderate    Patient Progress  Patient progress: stable      Disposition  Final diagnoses:   Generalized abdominal pain   Constipation, unspecified constipation type     Time reflects when diagnosis was documented in both MDM as applicable and the Disposition within this note     Time User Action Codes Description Comment    11/27/2022  9:24 PM Simeon Waite Add [R10 [id number]] Generalized abdominal pain     11/27/2022  9:24 PM Simeon Waite Add [K59 00] Constipation, unspecified constipation type       ED Disposition     ED Disposition   Discharge    Condition   Stable    Date/Time   Sun Nov 27, 2022  9:26 PM    Comment   [address] discharge to home/self care                 Follow-up Information     Follow up With Specialties Details Why Contact Info Additional Information    Damien Wills MD Family Medicine Schedule an appointment as soon as possible for a visit  As needed [facility] [address] [id number]  [address] [facility] Emergency Department Emergency Medicine  As needed, If symptoms worsen [address] [id number] [address] [facility] Emergency Department          Discharge Medication List as of 11/27/2022  9:28 PM      START taking these medications    Details   polyethylene glycol (MIRALAX) 17 g packet Take 17 g by mouth daily for 7 days, Starting Sun 11/27/2022, Until Sun 12/4/2022, Normal         CONTINUE these medications which have CHANGED    Details   dicyclomine (BENTYL) 10 mg capsule Take 1 capsule (10 mg total) by mouth 4 (four) times a day (before meals and at bedtime) for 7 days, Starting Sun 11/27/2022, Until Sun 12/4/2022, Normal         CONTINUE these medications which have NOT CHANGED    Details   albuterol (PROVENTIL HFA,VENTOLIN HFA) 90 mcg/act inhaler Inhale 2 puffs every 6 (six) hours as needed for wheezing, Starting Tue 8/9/2022, Normal      dicyclomine (BENTYL) 20 mg tablet Take 1 tablet (20 mg total) by mouth 2 (two) times a day as needed (abdominal pain), Starting Mon 1/18/2021, Normal      ibuprofen (MOTRIN) 400 mg tablet Take 1 tablet (400 mg total) by mouth every 6 (six) hours as needed for mild pain (Body aches or fever) for up to 7 days, Starting Tue 8/9/2022, Until Tue 8/16/2022 at 2359, Normal      lidocaine (LIDODERM) 5 % Apply 1 patch topically daily Remove & Discard patch within 12 hours or as directed by MD, Starting Fri 5/14/2021, Normal      meloxicam (Mobic) 15 mg tablet Take 1 tablet (15 mg total) by mouth daily, Starting Mon 2/21/2022, Normal      methocarbamol (ROBAXIN) 500 mg tablet Take 1 tablet (500 mg total) by mouth 2 (two) times a day, Starting Fri 5/14/2021, Normal      naproxen (NAPROSYN) 500 mg tablet Take 1 tablet (500 mg total) by mouth every 12 (twelve) hours as needed for mild pain or moderate pain, Starting Fri 2/28/2020, Print      omeprazole (PriLOSEC) 20 mg delayed release capsule Take 1 capsule (20 mg total) by mouth daily, Starting Fri 7/30/2021, Print      ondansetron (ZOFRAN-ODT) 4 mg disintegrating tablet Take 1 tablet (4 mg total) by mouth every 8 (eight) hours as needed for nausea or vomiting for up to 12 doses, Starting Tue 3/8/2022, Normal      sucralfate (CARAFATE) 1 g tablet Take 1 tablet (1 g total) by mouth 4 (four) times a day, Starting Fri 7/30/2021, Print           No discharge procedures on file  PDMP Review     None           ED Provider  Attending physically available and evaluated Viky Reynolds  PATRICK managed the patient along with the ED Attending      Electronically Signed by         Jennifer Fiore MD  11/27/22 7276

## 2022-11-28 NOTE — DISCHARGE INSTRUCTIONS
Please follow-up with your primary care provider as soon as possible for continued evaluation of her symptoms  Based off her laboratory evaluation and imaging studies here in the emergency department, you have been provided with recommendations to help with your    Please return to the emergency department if you experience worsening symptoms that include but are not limited to: Loss of consciousness, inability to tolerate any foods or liquids by mouth, inability to pass any bowel movements, persistent fever, persistent abdominal pain that does not improve with medications, changes in her urination, new rashes, or inability to catch your breath

## 2022-12-29 ENCOUNTER — APPOINTMENT (EMERGENCY)
Dept: CT IMAGING | Facility: HOSPITAL | Age: 52
End: 2022-12-29

## 2022-12-29 ENCOUNTER — HOSPITAL ENCOUNTER (EMERGENCY)
Facility: HOSPITAL | Age: 52
Discharge: HOME | End: 2022-12-29
Attending: EMERGENCY MEDICINE

## 2022-12-29 VITALS
RESPIRATION RATE: 14 BRPM | TEMPERATURE: 98.1 F | SYSTOLIC BLOOD PRESSURE: 133 MMHG | DIASTOLIC BLOOD PRESSURE: 81 MMHG | BODY MASS INDEX: 28.53 KG/M2 | HEART RATE: 69 BPM | HEIGHT: 61 IN | OXYGEN SATURATION: 98 %

## 2022-12-29 DIAGNOSIS — G89.29 CHRONIC ABDOMINAL PAIN: Primary | ICD-10-CM

## 2022-12-29 DIAGNOSIS — R10.9 CHRONIC ABDOMINAL PAIN: Primary | ICD-10-CM

## 2022-12-29 DIAGNOSIS — R11.0 NAUSEA: ICD-10-CM

## 2022-12-29 DIAGNOSIS — R10.13 EPIGASTRIC PAIN: ICD-10-CM

## 2022-12-29 DIAGNOSIS — K20.90 ESOPHAGITIS: ICD-10-CM

## 2022-12-29 DIAGNOSIS — K29.70 GASTRITIS: ICD-10-CM

## 2022-12-29 DIAGNOSIS — K42.9 UMBILICAL HERNIA WITHOUT OBSTRUCTION OR GANGRENE: ICD-10-CM

## 2022-12-29 LAB
ALBUMIN SERPL BCP-MCNC: 3.9 G/DL (ref 3.5–5)
ALP SERPL-CCNC: 113 U/L (ref 46–116)
ALT SERPL W P-5'-P-CCNC: 20 U/L (ref 12–78)
ANION GAP SERPL CALCULATED.3IONS-SCNC: 9 MMOL/L (ref 4–13)
AST SERPL W P-5'-P-CCNC: 16 U/L (ref 5–45)
BASOPHILS # BLD AUTO: 0.03 THOUSANDS/ÂΜL (ref 0–0.1)
BASOPHILS NFR BLD AUTO: 0 % (ref 0–1)
BILIRUB SERPL-MCNC: 0.76 MG/DL (ref 0.2–1)
BUN SERPL-MCNC: 17 MG/DL (ref 5–25)
CALCIUM SERPL-MCNC: 9.3 MG/DL (ref 8.3–10.1)
CHLORIDE SERPL-SCNC: 105 MMOL/L (ref 96–108)
CO2 SERPL-SCNC: 27 MMOL/L (ref 21–32)
CREAT SERPL-MCNC: 0.95 MG/DL (ref 0.6–1.3)
EOSINOPHIL # BLD AUTO: 0.27 THOUSAND/ÂΜL (ref 0–0.61)
EOSINOPHIL NFR BLD AUTO: 3 % (ref 0–6)
ERYTHROCYTE [DISTWIDTH] IN BLOOD BY AUTOMATED COUNT: 12.3 % (ref 11.6–15.1)
GFR SERPL CREATININE-BSD FRML MDRD: 70 ML/MIN/1.73SQ M
GLUCOSE SERPL-MCNC: 104 MG/DL (ref 65–140)
HCT VFR BLD AUTO: 44.5 % (ref 34.8–46.1)
HGB BLD-MCNC: 14.7 G/DL (ref 11.5–15.4)
IMM GRANULOCYTES # BLD AUTO: 0.02 THOUSAND/UL (ref 0–0.2)
IMM GRANULOCYTES NFR BLD AUTO: 0 % (ref 0–2)
LIPASE SERPL-CCNC: 133 U/L (ref 73–393)
LYMPHOCYTES # BLD AUTO: 1.06 THOUSANDS/ÂΜL (ref 0.6–4.47)
LYMPHOCYTES NFR BLD AUTO: 11 % (ref 14–44)
MCH RBC QN AUTO: 31.1 PG (ref 26.8–34.3)
MCHC RBC AUTO-ENTMCNC: 33 G/DL (ref 31.4–37.4)
MCV RBC AUTO: 94 FL (ref 82–98)
MONOCYTES # BLD AUTO: 0.5 THOUSAND/ÂΜL (ref 0.17–1.22)
MONOCYTES NFR BLD AUTO: 5 % (ref 4–12)
NEUTROPHILS # BLD AUTO: 7.38 THOUSANDS/ÂΜL (ref 1.85–7.62)
NEUTS SEG NFR BLD AUTO: 81 % (ref 43–75)
NRBC BLD AUTO-RTO: 0 /100 WBCS
PLATELET # BLD AUTO: 263 THOUSANDS/UL (ref 149–390)
PMV BLD AUTO: 10.5 FL (ref 8.9–12.7)
POTASSIUM SERPL-SCNC: 4.3 MMOL/L (ref 3.5–5.3)
PROT SERPL-MCNC: 7.9 G/DL (ref 6.4–8.4)
RBC # BLD AUTO: 4.73 MILLION/UL (ref 3.81–5.12)
SODIUM SERPL-SCNC: 141 MMOL/L (ref 135–147)
WBC # BLD AUTO: 9.26 THOUSAND/UL (ref 4.31–10.16)

## 2022-12-29 RX ORDER — ONDANSETRON 4 MG/1
4 TABLET, ORALLY DISINTEGRATING ORAL EVERY 8 HOURS PRN
Qty: 12 TABLET | Refills: 0 | Status: SHIPPED | OUTPATIENT
Start: 2022-12-29

## 2022-12-29 RX ORDER — ONDANSETRON 2 MG/ML
4 INJECTION INTRAMUSCULAR; INTRAVENOUS ONCE
Status: COMPLETED | OUTPATIENT
Start: 2022-12-29 | End: 2022-12-29

## 2022-12-29 RX ORDER — KETOROLAC TROMETHAMINE 30 MG/ML
30 INJECTION, SOLUTION INTRAMUSCULAR; INTRAVENOUS ONCE
Status: COMPLETED | OUTPATIENT
Start: 2022-12-29 | End: 2022-12-29

## 2022-12-29 RX ORDER — OMEPRAZOLE 40 MG/1
40 CAPSULE, DELAYED RELEASE ORAL DAILY
Qty: 30 CAPSULE | Refills: 0 | Status: SHIPPED | OUTPATIENT
Start: 2022-12-29 | End: 2023-01-28

## 2022-12-29 RX ORDER — SUCRALFATE 1 G/1
1 TABLET ORAL 4 TIMES DAILY
Qty: 56 TABLET | Refills: 0 | Status: SHIPPED | OUTPATIENT
Start: 2022-12-29 | End: 2023-01-12

## 2022-12-29 RX ORDER — FAMOTIDINE 10 MG/ML
20 INJECTION, SOLUTION INTRAVENOUS ONCE
Status: COMPLETED | OUTPATIENT
Start: 2022-12-29 | End: 2022-12-29

## 2022-12-29 RX ADMIN — KETOROLAC TROMETHAMINE 30 MG: 30 INJECTION, SOLUTION INTRAMUSCULAR; INTRAVENOUS at 10:09

## 2022-12-29 RX ADMIN — ONDANSETRON 4 MG: 2 INJECTION INTRAMUSCULAR; INTRAVENOUS at 10:07

## 2022-12-29 RX ADMIN — IOHEXOL 100 ML: 350 INJECTION, SOLUTION INTRAVENOUS at 11:46

## 2022-12-29 RX ADMIN — FAMOTIDINE 20 MG: 10 INJECTION INTRAVENOUS at 10:11

## 2022-12-29 RX ADMIN — SODIUM CHLORIDE 1000 ML: 0.9 INJECTION, SOLUTION INTRAVENOUS at 10:05

## 2022-12-29 NOTE — DISCHARGE INSTRUCTIONS
La tomografía computarizada mostró purvi pequeña hernia umbilical sin evidencia de obstrucción  Es poco probable que esta sea la causa de tobin dolor  Tobin gráfico indica que tiene gastritis/esofagitis crónica, las cuales tienen más probabilidades de ser la causa de tobin dolor abdominal  Es muy importante que tome jose alejandro medicamentos regulares (carafate, prilosec) diariamente según lo prescrito  Además, debe cumplir con las restricciones dietéticas asociadas con la gastritis (incluidas en las instrucciones de elian)  No existe un tratamiento adicional para tobin gastritis y/o esofagitis disponible en el Departamento de [facility] [address] [address]  Tobin médico de atención primaria lo derivó nuevamente al gastroenterólogo  Asegúrese de llamar y programar purvi camden de seguimiento lo antes posible si aún no lo delgado hecho  [address] del carlos alberto de [facility] que está de kian en el hospital  Puede programar purvi camden con la oficina para discutir cualquier tratamiento electivo de tobin hernia umbilical     Si se le acaban los medicamentos antes de tobin seguimiento con gastroenterología, comuníquese con tobin proveedor de atención primaria para obtener resurtidos  Es muy importante que tome estos medicamentos todos los días, incluso si se siente ana luisa

## 2022-12-29 NOTE — ED PROVIDER NOTES
History  Chief Complaint   Patient presents with   • Abdominal Pain     Abdominal pain x since yesterday, b/l flank pain, nausea     52y F here for evaluation of abd pain  Per records, pt w/ recurrent episodes of abd pain, ?related to chronic constipaiton - has seen GI and had colonoscopy and EGD in April w/ findings of gastritis and esophageal inflammation  Was most recently seen 11/27 at [facility] for similar symptoms and told w/u negative  Has since reportedly seen her PCM her told her the symptoms may be due to a hernia that was seen on CT  Pt reports some episodes of nausea and intermittent vomiting yesterday assoc w/ some loose stools  Today reports 8/10 upper / periumbilical abd pain, worse w/ movement/palpation  Pt thinks it's her hernia  Denies f/c/s, no cough/congestion, no sick contacts  History provided by:  Patient   used: No    Abdominal Pain  Pain location:  Generalized  Pain quality: cramping and sharp    Pain radiates to:  Does not radiate  Pain severity:  Severe  Onset quality:  Unable to specify  Timing:  Intermittent  Progression:  Waxing and waning  Chronicity:  New  Context: not diet changes, not recent illness, not sick contacts and not suspicious food intake    Relieved by:  Nothing  Worsened by:  Palpation and position changes  Ineffective treatments:  None tried  Associated symptoms: anorexia, nausea and vomiting    Associated symptoms: no chest pain, no chills, no dysuria, no fatigue, no fever, no hematemesis, no hematochezia and no shortness of breath        Prior to Admission Medications   Prescriptions Last Dose Informant Patient Reported? Taking?    albuterol (PROVENTIL HFA,VENTOLIN HFA) 90 mcg/act inhaler   No No   Sig: Inhale 2 puffs every 6 (six) hours as needed for wheezing   dicyclomine (BENTYL) 10 mg capsule   No No   Sig: Take 1 capsule (10 mg total) by mouth 4 (four) times a day (before meals and at bedtime) for 7 days   dicyclomine (BENTYL) 20 mg tablet   No No   Sig: Take 1 tablet (20 mg total) by mouth 2 (two) times a day as needed (abdominal pain)   ibuprofen (MOTRIN) 400 mg tablet   No No   Sig: Take 1 tablet (400 mg total) by mouth every 6 (six) hours as needed for mild pain (Body aches or fever) for up to 7 days   lidocaine (LIDODERM) 5 %   No No   Sig: Apply 1 patch topically daily Remove & Discard patch within 12 hours or as directed by MD   meloxicam (Mobic) 15 mg tablet   No No   Sig: Take 1 tablet (15 mg total) by mouth daily   methocarbamol (ROBAXIN) 500 mg tablet   No No   Sig: Take 1 tablet (500 mg total) by mouth 2 (two) times a day   naproxen (NAPROSYN) 500 mg tablet   No No   Sig: Take 1 tablet (500 mg total) by mouth every 12 (twelve) hours as needed for mild pain or moderate pain   Patient not taking: Reported on 1/18/2021   omeprazole (PriLOSEC) 20 mg delayed release capsule   No No   Sig: Take 1 capsule (20 mg total) by mouth daily   ondansetron (ZOFRAN-ODT) 4 mg disintegrating tablet   No No   Sig: Take 1 tablet (4 mg total) by mouth every 8 (eight) hours as needed for nausea or vomiting for up to 12 doses   ondansetron (ZOFRAN-ODT) 4 mg disintegrating tablet   No Yes   Sig: Take 1 tablet (4 mg total) by mouth every 8 (eight) hours as needed for nausea or vomiting for up to 12 doses   polyethylene glycol (MIRALAX) 17 g packet   No No   Sig: Take 17 g by mouth daily for 7 days   sucralfate (CARAFATE) 1 g tablet   No No   Sig: Take 1 tablet (1 g total) by mouth 4 (four) times a day   sucralfate (CARAFATE) 1 g tablet   No Yes   Sig: Take 1 tablet (1 g total) by mouth 4 (four) times a day for 14 days      Facility-Administered Medications: None       Past Medical History:   Diagnosis Date   • Asthma    • Colitis        Past Surgical History:   Procedure Laterality Date   • ABDOMINAL SURGERY     • HYSTERECTOMY         History reviewed  No pertinent family history    I have reviewed and agree with the history as documented  E-Cigarette/Vaping   • E-Cigarette Use Never User      E-Cigarette/Vaping Substances   • Nicotine No    • THC No    • CBD No    • Flavoring No    • Other No    • Unknown No      Social History     Tobacco Use   • Smoking status: Never   • Smokeless tobacco: Never   Vaping Use   • Vaping Use: Never used   Substance Use Topics   • Alcohol use: Not Currently   • Drug use: No       Review of Systems   Constitutional: Negative for chills, fatigue and fever  Respiratory: Negative for shortness of breath  Cardiovascular: Negative for chest pain  Gastrointestinal: Positive for abdominal pain, anorexia, nausea and vomiting  Negative for hematemesis and hematochezia  Genitourinary: Negative for dysuria  All other systems reviewed and are negative  Physical Exam  Physical Exam  Vitals and nursing note reviewed  Constitutional:       General: She is not in acute distress  Appearance: Normal appearance  She is not ill-appearing  HENT:      Nose: Nose normal       Mouth/Throat:      Mouth: Mucous membranes are moist    Eyes:      General: No scleral icterus  Conjunctiva/sclera: Conjunctivae normal    Cardiovascular:      Rate and Rhythm: Normal rate and regular rhythm  Heart sounds: No murmur heard  Pulmonary:      Effort: Pulmonary effort is normal       Breath sounds: Normal breath sounds  Abdominal:      General: Bowel sounds are normal  There is no distension  Palpations: Abdomen is soft  Tenderness: There is abdominal tenderness (to even very light palpation  no focal masses/focal tenderness appreciated  )  There is no guarding or rebound  Hernia: There is no hernia in the umbilical area (no palpable umbilical hernia/mass appreciated)  Musculoskeletal:         General: No swelling or tenderness  Cervical back: Normal range of motion  Skin:     General: Skin is warm and dry  Neurological:      General: No focal deficit present        Mental Status: She is alert  Psychiatric:         Mood and Affect: Mood is anxious           Vital Signs  ED Triage Vitals   Temperature Pulse Respirations Blood Pressure SpO2   12/29/22 0939 12/29/22 0939 12/29/22 0939 12/29/22 0939 12/29/22 0939   98 1 °F (36 7 °C) 91 16 133/93 99 %      Temp src Heart Rate Source Patient Position - Orthostatic VS BP Location FiO2 (%)   -- 12/29/22 1100 -- -- --    Monitor         Pain Score       12/29/22 0939       8           Vitals:    12/29/22 0939 12/29/22 1100 12/29/22 1130 12/29/22 1314   BP: 133/93   133/81   Pulse: 91 78 72 69         Visual Acuity      ED Medications  Medications   sodium chloride 0 9 % bolus 1,000 mL (0 mL Intravenous Stopped 12/29/22 1137)   ondansetron (ZOFRAN) injection 4 mg (4 mg Intravenous Given 12/29/22 1007)   ketorolac (TORADOL) injection 30 mg (30 mg Intravenous Given 12/29/22 1009)   Famotidine (PF) (PEPCID) injection 20 mg (20 mg Intravenous Given 12/29/22 1011)   iohexol (OMNIPAQUE) 350 MG/ML injection (SINGLE-DOSE) 100 mL (100 mL Intravenous Given 12/29/22 1146)       Diagnostic Studies  Results Reviewed     Procedure Component Value Units Date/Time    Comprehensive metabolic panel [[id number]] Collected: 12/29/22 1000    Lab Status: Final result Specimen: Blood from Arm, Left Updated: 12/29/22 1032     Sodium 141 mmol/L      Potassium 4 3 mmol/L      Chloride 105 mmol/L      CO2 27 mmol/L      ANION GAP 9 mmol/L      BUN 17 mg/dL      Creatinine 0 95 mg/dL      Glucose 104 mg/dL      Calcium 9 3 mg/dL      AST 16 U/L      ALT 20 U/L      Alkaline Phosphatase 113 U/L      Total Protein 7 9 g/dL      Albumin 3 9 g/dL      Total Bilirubin 0 76 mg/dL      eGFR 70 ml/min/1 73sq m     Narrative:      [place] guidelines for Chronic Kidney Disease (CKD):   •  Stage 1 with normal or high GFR (GFR > 90 mL/min/1 73 square meters)  •  Stage 2 Mild CKD (GFR = 60-89 mL/min/1 73 square meters)  •  Stage 3A Moderate CKD (GFR = 45-59 mL/min/1 73 square meters)  •  Stage 3B Moderate CKD (GFR = 30-44 mL/min/1 73 square meters)  •  Stage 4 Severe CKD (GFR = 15-29 mL/min/1 73 square meters)  •  Stage 5 End Stage CKD (GFR <15 mL/min/1 73 square meters)  Note: GFR calculation is accurate only with a steady state creatinine    Lipase [[id number]]  (Normal) Collected: 12/29/22 1000    Lab Status: Final result Specimen: Blood from Arm, Left Updated: 12/29/22 1032     Lipase 133 u/L     CBC and differential [[id number]]  (Abnormal) Collected: 12/29/22 1000    Lab Status: Final result Specimen: Blood from Arm, Left Updated: 12/29/22 1007     WBC 9 26 Thousand/uL      RBC 4 73 Million/uL      Hemoglobin 14 7 g/dL      Hematocrit 44 5 %      MCV 94 fL      MCH 31 1 pg      MCHC 33 0 g/dL      RDW 12 3 %      MPV 10 5 fL      Platelets 777 Thousands/uL      nRBC 0 /100 WBCs      Neutrophils Relative 81 %      Immat GRANS % 0 %      Lymphocytes Relative 11 %      Monocytes Relative 5 %      Eosinophils Relative 3 %      Basophils Relative 0 %      Neutrophils Absolute 7 38 Thousands/µL      Immature Grans Absolute 0 02 Thousand/uL      Lymphocytes Absolute 1 06 Thousands/µL      Monocytes Absolute 0 50 Thousand/µL      Eosinophils Absolute 0 27 Thousand/µL      Basophils Absolute 0 03 Thousands/µL                  CT abdomen pelvis with contrast   ED Interpretation by Chris Stone DO (12/29 1308)   See below      Final Result by Charly Contreras MD (12/29 1246)      Chronic, small fat-containing umbilical hernia  No bowel containing hernia  No acute findings in the abdomen or pelvis  Workstation performed: [id number]                    Procedures  Procedures         ED Course  ED Course as of 12/29/22 1516   Thu Dec 29, 2022   1037 WBC: 9 26   1308 No acute findings on CT  Small fat containing umbilical hernia noted with no reported acute inflammatory changes  Pt w/ hx of recurrent abd pain    Will d/c w/ referral to back to GI and General Surgery   1344 When discussing results w/ pt - when confirming her maintenance meds, pt said she didn't know what meds I was talking about, so likely some element of medication non-compliance contributing to her recurrent symptoms                               SBIRT 20yo+    Flowsheet Row Most Recent Value   SBIRT (23 yo +)    In order to provide better care to our patients, we are screening all of our patients for alcohol and drug use  Would it be okay to ask you these screening questions? Yes Filed at: 12/29/2022 0944   Initial Alcohol Screen: US AUDIT-C     1  How often do you have a drink containing alcohol? 0 Filed at: 12/29/2022 0944   2  How many drinks containing alcohol do you have on a typical day you are drinking? 0 Filed at: 12/29/2022 0944   3a  Male UNDER 65: How often do you have five or more drinks on one occasion? 0 Filed at: 12/29/2022 0944   3b  FEMALE Any Age, or MALE 65+: How often do you have 4 or more drinks on one occassion? 0 Filed at: 12/29/2022 0944   Audit-C Score 0 Filed at: 12/29/2022 1963   VALERIE: How many times in the past year have you    Used an illegal drug or used a prescription medication for non-medical reasons? Never Filed at: 12/29/2022 0944                    MDM  Number of Diagnoses or Management Options  Chronic abdominal pain: new and requires workup  Epigastric pain: new and requires workup  Esophagitis: new and requires workup  Gastritis: new and requires workup  Nausea: new and requires workup  Umbilical hernia without obstruction or gangrene: new and requires workup  Diagnosis management comments: Pt here w/ concerns for abd pain due to umbilical hernia  Pt w/ long hx of abd pain w/ multiple evaluations including egd/colonoscopy in the past   Reports mult episodes nbnb emesis before worsening pain started  ddx includes/not limited to exacerbation of her chronic gastritis/esophagitis, pancreatitis, enteritis/colitis, IBD, hernia   Will ck labs, ct a/p, give fluids, antiemetics, pain meds and re-eval       Amount and/or Complexity of Data Reviewed  Clinical lab tests: reviewed and ordered  Tests in the radiology section of CPT®: ordered and reviewed  Obtain history from someone other than the patient: yes  Review and summarize past medical records: yes  Independent visualization of images, tracings, or specimens: yes        Disposition  Final diagnoses:   Chronic abdominal pain   Gastritis   Umbilical hernia without obstruction or gangrene   Esophagitis   Epigastric pain   Nausea     Time reflects when diagnosis was documented in both MDM as applicable and the Disposition within this note     Time User Action Codes Description Comment    12/29/2022  1:27 PM Lucia Shaw L Add [R10 9,  G89 29] Chronic abdominal pain     12/29/2022  1:28 PM Lucia Shaw L Add [K29 70] Gastritis     12/29/2022  1:28 PM Lucia Shaw L Add [I44 7] Umbilical hernia without obstruction or gangrene     12/29/2022  1:34 PM MichelekyLucia L Add [K20 90] Esophagitis     12/29/2022  1:46 PM Lucia Shaw L Add [R10 13] Epigastric pain     12/29/2022  1:47 PM Caesarpatel Miller L Add [R11 0] Nausea       ED Disposition     ED Disposition   Discharge    Condition   Stable    Date/Time   u Dec 29, 2022  1:27 PM    Comment   [address] discharge to home/self care                 Follow-up Information     Follow up With Specialties Details Why Contact Info Additional Information    [address] [facility] Schedule an appointment as soon as possible for a visit  for further evaluation and treatment of your umbilical hernia [address]  [address] [facility] [id number]  [address], [place] [address], [address], [place], [place], [address]          Discharge Medication List as of 12/29/2022  1:50 PM      CONTINUE these medications which have CHANGED    Details   omeprazole (PriLOSEC) 40 MG capsule Take 1 capsule (40 mg total) by mouth daily, Starting Thu 12/29/2022, Until Sat 1/28/2023, Normal      ondansetron (ZOFRAN-ODT) 4 mg disintegrating tablet Take 1 tablet (4 mg total) by mouth every 8 (eight) hours as needed for nausea or vomiting for up to 12 doses, Starting Thu 12/29/2022, Normal      sucralfate (CARAFATE) 1 g tablet Take 1 tablet (1 g total) by mouth 4 (four) times a day for 14 days, Starting Thu 12/29/2022, Until Thu 1/12/2023, Normal         CONTINUE these medications which have NOT CHANGED    Details   albuterol (PROVENTIL HFA,VENTOLIN HFA) 90 mcg/act inhaler Inhale 2 puffs every 6 (six) hours as needed for wheezing, Starting Tue 8/9/2022, Normal      dicyclomine (BENTYL) 10 mg capsule Take 1 capsule (10 mg total) by mouth 4 (four) times a day (before meals and at bedtime) for 7 days, Starting Sun 11/27/2022, Until Sun 12/4/2022, Normal      dicyclomine (BENTYL) 20 mg tablet Take 1 tablet (20 mg total) by mouth 2 (two) times a day as needed (abdominal pain), Starting Mon 1/18/2021, Normal      ibuprofen (MOTRIN) 400 mg tablet Take 1 tablet (400 mg total) by mouth every 6 (six) hours as needed for mild pain (Body aches or fever) for up to 7 days, Starting Tue 8/9/2022, Until Tue 8/16/2022 at 2359, Normal      lidocaine (LIDODERM) 5 % Apply 1 patch topically daily Remove & Discard patch within 12 hours or as directed by MD, Starting Fri 5/14/2021, Normal      meloxicam (Mobic) 15 mg tablet Take 1 tablet (15 mg total) by mouth daily, Starting Mon 2/21/2022, Normal      methocarbamol (ROBAXIN) 500 mg tablet Take 1 tablet (500 mg total) by mouth 2 (two) times a day, Starting Fri 5/14/2021, Normal      polyethylene glycol (MIRALAX) 17 g packet Take 17 g by mouth daily for 7 days, Starting Sun 11/27/2022, Until Sun 12/4/2022, Normal         STOP taking these medications       naproxen (NAPROSYN) 500 mg tablet Comments:   Reason for Stopping:               No discharge procedures on file     PDMP Review     None          ED Provider  Electronically Signed by           Amaya Hudson,   12/29/22 7959

## 2023-03-05 ENCOUNTER — APPOINTMENT (EMERGENCY)
Dept: ULTRASOUND IMAGING | Facility: HOSPITAL | Age: 53
End: 2023-03-05

## 2023-03-05 ENCOUNTER — APPOINTMENT (EMERGENCY)
Dept: CT IMAGING | Facility: HOSPITAL | Age: 53
End: 2023-03-05

## 2023-03-05 ENCOUNTER — HOSPITAL ENCOUNTER (EMERGENCY)
Facility: HOSPITAL | Age: 53
Discharge: HOME | End: 2023-03-05
Attending: STUDENT IN AN ORGANIZED HEALTH CARE EDUCATION/TRAINING PROGRAM | Admitting: STUDENT IN AN ORGANIZED HEALTH CARE EDUCATION/TRAINING PROGRAM

## 2023-03-05 VITALS
HEART RATE: 66 BPM | BODY MASS INDEX: 28.49 KG/M2 | DIASTOLIC BLOOD PRESSURE: 84 MMHG | SYSTOLIC BLOOD PRESSURE: 137 MMHG | WEIGHT: 150.79 LBS | TEMPERATURE: 98.7 F | OXYGEN SATURATION: 100 % | RESPIRATION RATE: 16 BRPM

## 2023-03-05 DIAGNOSIS — D21.9 FIBROID: ICD-10-CM

## 2023-03-05 DIAGNOSIS — R93.89 THICKENED ENDOMETRIUM: ICD-10-CM

## 2023-03-05 DIAGNOSIS — G89.29 CHRONIC PAIN: ICD-10-CM

## 2023-03-05 DIAGNOSIS — R10.84 GENERALIZED ABDOMINAL PAIN: Primary | ICD-10-CM

## 2023-03-05 LAB
ALBUMIN SERPL BCP-MCNC: 4 G/DL (ref 3.5–5)
ALP SERPL-CCNC: 96 U/L (ref 43–122)
ALT SERPL W P-5'-P-CCNC: 15 U/L
ANION GAP SERPL CALCULATED.3IONS-SCNC: 5 MMOL/L (ref 5–14)
AST SERPL W P-5'-P-CCNC: 17 U/L (ref 14–36)
BACTERIA UR QL AUTO: NORMAL /HPF
BASOPHILS # BLD AUTO: 0.03 THOUSANDS/ÂΜL (ref 0–0.1)
BASOPHILS NFR BLD AUTO: 0 % (ref 0–1)
BILIRUB SERPL-MCNC: 0.57 MG/DL (ref 0.2–1)
BILIRUB UR QL STRIP: NEGATIVE
BUN SERPL-MCNC: 21 MG/DL (ref 5–25)
CALCIUM SERPL-MCNC: 8.9 MG/DL (ref 8.4–10.2)
CARDIAC TROPONIN I PNL SERPL HS: 2 NG/L
CHLORIDE SERPL-SCNC: 108 MMOL/L (ref 96–108)
CLARITY UR: CLEAR
CO2 SERPL-SCNC: 28 MMOL/L (ref 21–32)
COLOR UR: YELLOW
CREAT SERPL-MCNC: 0.97 MG/DL (ref 0.6–1.2)
EOSINOPHIL # BLD AUTO: 0.76 THOUSAND/ÂΜL (ref 0–0.61)
EOSINOPHIL NFR BLD AUTO: 10 % (ref 0–6)
ERYTHROCYTE [DISTWIDTH] IN BLOOD BY AUTOMATED COUNT: 12.5 % (ref 11.6–15.1)
EXT PREGNANCY TEST URINE: NEGATIVE
EXT. CONTROL: NORMAL
GFR SERPL CREATININE-BSD FRML MDRD: 68 ML/MIN/1.73SQ M
GLUCOSE SERPL-MCNC: 99 MG/DL (ref 70–99)
GLUCOSE UR STRIP-MCNC: NEGATIVE MG/DL
HCT VFR BLD AUTO: 42.3 % (ref 34.8–46.1)
HGB BLD-MCNC: 13.4 G/DL (ref 11.5–15.4)
HGB UR QL STRIP.AUTO: 25
IMM GRANULOCYTES # BLD AUTO: 0.02 THOUSAND/UL (ref 0–0.2)
IMM GRANULOCYTES NFR BLD AUTO: 0 % (ref 0–2)
KETONES UR STRIP-MCNC: NEGATIVE MG/DL
LEUKOCYTE ESTERASE UR QL STRIP: NEGATIVE
LIPASE SERPL-CCNC: 138 U/L (ref 23–300)
LYMPHOCYTES # BLD AUTO: 2.27 THOUSANDS/ÂΜL (ref 0.6–4.47)
LYMPHOCYTES NFR BLD AUTO: 29 % (ref 14–44)
MCH RBC QN AUTO: 30.7 PG (ref 26.8–34.3)
MCHC RBC AUTO-ENTMCNC: 31.7 G/DL (ref 31.4–37.4)
MCV RBC AUTO: 97 FL (ref 82–98)
MONOCYTES # BLD AUTO: 0.5 THOUSAND/ÂΜL (ref 0.17–1.22)
MONOCYTES NFR BLD AUTO: 7 % (ref 4–12)
NEUTROPHILS # BLD AUTO: 4.16 THOUSANDS/ÂΜL (ref 1.85–7.62)
NEUTS SEG NFR BLD AUTO: 54 % (ref 43–75)
NITRITE UR QL STRIP: NEGATIVE
NON-SQ EPI CELLS URNS QL MICRO: NORMAL /HPF
NRBC BLD AUTO-RTO: 0 /100 WBCS
PH UR STRIP.AUTO: 7 [PH]
PLATELET # BLD AUTO: 309 THOUSANDS/UL (ref 149–390)
PMV BLD AUTO: 10 FL (ref 8.9–12.7)
POTASSIUM SERPL-SCNC: 4.3 MMOL/L (ref 3.5–5.3)
PROT SERPL-MCNC: 7.3 G/DL (ref 6.4–8.4)
PROT UR STRIP-MCNC: NEGATIVE MG/DL
RBC # BLD AUTO: 4.36 MILLION/UL (ref 3.81–5.12)
RBC #/AREA URNS AUTO: NORMAL /HPF
SODIUM SERPL-SCNC: 141 MMOL/L (ref 135–147)
SP GR UR STRIP.AUTO: 1.02 (ref 1–1.04)
UROBILINOGEN UA: NEGATIVE MG/DL
WBC # BLD AUTO: 7.74 THOUSAND/UL (ref 4.31–10.16)
WBC #/AREA URNS AUTO: NORMAL /HPF

## 2023-03-05 RX ORDER — ONDANSETRON 4 MG/1
4 TABLET, FILM COATED ORAL EVERY 8 HOURS PRN
Qty: 20 TABLET | Refills: 0 | Status: SHIPPED | OUTPATIENT
Start: 2023-03-05

## 2023-03-05 RX ORDER — SODIUM CHLORIDE 9 MG/ML
250 INJECTION, SOLUTION INTRAVENOUS CONTINUOUS
Status: DISCONTINUED | OUTPATIENT
Start: 2023-03-05 | End: 2023-03-05 | Stop reason: HOSPADM

## 2023-03-05 RX ORDER — OMEPRAZOLE 20 MG/1
20 CAPSULE, DELAYED RELEASE ORAL DAILY
Qty: 20 CAPSULE | Refills: 0 | Status: SHIPPED | OUTPATIENT
Start: 2023-03-05

## 2023-03-05 RX ORDER — KETOROLAC TROMETHAMINE 30 MG/ML
30 INJECTION, SOLUTION INTRAMUSCULAR; INTRAVENOUS ONCE
Status: COMPLETED | OUTPATIENT
Start: 2023-03-05 | End: 2023-03-05

## 2023-03-05 RX ORDER — IBUPROFEN 600 MG/1
600 TABLET ORAL EVERY 6 HOURS PRN
Qty: 30 TABLET | Refills: 0 | Status: SHIPPED | OUTPATIENT
Start: 2023-03-05

## 2023-03-05 RX ORDER — DICYCLOMINE HYDROCHLORIDE 10 MG/1
10 CAPSULE ORAL 2 TIMES DAILY
Qty: 10 CAPSULE | Refills: 0 | Status: SHIPPED | OUTPATIENT
Start: 2023-03-05 | End: 2023-03-10

## 2023-03-05 RX ADMIN — IOHEXOL 100 ML: 350 INJECTION, SOLUTION INTRAVENOUS at 11:57

## 2023-03-05 RX ADMIN — SODIUM CHLORIDE 250 ML/HR: 0.9 INJECTION, SOLUTION INTRAVENOUS at 10:58

## 2023-03-05 RX ADMIN — KETOROLAC TROMETHAMINE 30 MG: 30 INJECTION, SOLUTION INTRAMUSCULAR; INTRAVENOUS at 11:47

## 2023-03-05 NOTE — ED PROVIDER NOTES
History  Chief Complaint   Patient presents with   • Abdominal Pain     Upper abd pain that's worse in RUQ with N/V x3 days  Difficulty voiding and she states she drinks a lot of water  Pt reports history of ulcers and hernia in the past  No medications taken  Pt with 3 days of abdomen pain nausea and vomiting no chest pain       Abdominal Cramping  Pain location:  Epigastric, RUQ and suprapubic  Pain quality: aching    Pain radiates to:  Does not radiate  Pain severity:  Moderate  Onset quality:  Gradual  Duration:  3 days  Timing:  Constant  Progression:  Unchanged  Chronicity:  New  Context: not alcohol use    Relieved by:  Nothing  Worsened by:  Nothing  Ineffective treatments:  None tried  Associated symptoms: nausea and vomiting    Risk factors: no alcohol abuse        Prior to Admission Medications   Prescriptions Last Dose Informant Patient Reported? Taking?    albuterol (PROVENTIL HFA,VENTOLIN HFA) 90 mcg/act inhaler   No No   Sig: Inhale 2 puffs every 6 (six) hours as needed for wheezing   dicyclomine (BENTYL) 10 mg capsule   No No   Sig: Take 1 capsule (10 mg total) by mouth 4 (four) times a day (before meals and at bedtime) for 7 days   dicyclomine (BENTYL) 20 mg tablet   No No   Sig: Take 1 tablet (20 mg total) by mouth 2 (two) times a day as needed (abdominal pain)   ibuprofen (MOTRIN) 400 mg tablet   No No   Sig: Take 1 tablet (400 mg total) by mouth every 6 (six) hours as needed for mild pain (Body aches or fever) for up to 7 days   lidocaine (LIDODERM) 5 %   No No   Sig: Apply 1 patch topically daily Remove & Discard patch within 12 hours or as directed by MD   meloxicam (Mobic) 15 mg tablet   No No   Sig: Take 1 tablet (15 mg total) by mouth daily   methocarbamol (ROBAXIN) 500 mg tablet   No No   Sig: Take 1 tablet (500 mg total) by mouth 2 (two) times a day   omeprazole (PriLOSEC) 40 MG capsule   No No   Sig: Take 1 capsule (40 mg total) by mouth daily   ondansetron (ZOFRAN-ODT) 4 mg disintegrating tablet   No No   Sig: Take 1 tablet (4 mg total) by mouth every 8 (eight) hours as needed for nausea or vomiting for up to 12 doses   polyethylene glycol (MIRALAX) 17 g packet   No No   Sig: Take 17 g by mouth daily for 7 days   sucralfate (CARAFATE) 1 g tablet   No No   Sig: Take 1 tablet (1 g total) by mouth 4 (four) times a day for 14 days      Facility-Administered Medications: None       Past Medical History:   Diagnosis Date   • Asthma    • Colitis        Past Surgical History:   Procedure Laterality Date   • ABDOMINAL SURGERY     • TUBAL LIGATION         No family history on file  I have reviewed and agree with the history as documented  E-Cigarette/Vaping   • E-Cigarette Use Never User      E-Cigarette/Vaping Substances   • Nicotine No    • THC No    • CBD No    • Flavoring No    • Other No    • Unknown No      Social History     Tobacco Use   • Smoking status: Never   • Smokeless tobacco: Never   Vaping Use   • Vaping Use: Never used   Substance Use Topics   • Alcohol use: Not Currently   • Drug use: No       Review of Systems   Constitutional: Negative  HENT: Negative  Eyes: Negative  Respiratory: Negative  Cardiovascular: Negative  Gastrointestinal: Positive for abdominal pain, nausea and vomiting  Endocrine: Negative  Genitourinary: Negative  Musculoskeletal: Negative  Skin: Negative  Allergic/Immunologic: Negative  Neurological: Negative  Hematological: Negative  Psychiatric/Behavioral: Negative  All other systems reviewed and are negative  Physical Exam  Physical Exam  Vitals and nursing note reviewed  Exam conducted with a chaperone present  Constitutional:       Appearance: She is well-developed and normal weight        Comments: Pt states abd pain is still moderate to severe     Talked to pt about need for follow up with ob/gyn office, pt with     Need for follow up and pap smear and possilbe biopsy of endometrium discussed in [place] with     HENT:      Head: Normocephalic and atraumatic  Mouth/Throat:      Mouth: Mucous membranes are moist       Pharynx: Oropharynx is clear  Eyes:      Extraocular Movements: Extraocular movements intact  Pupils: Pupils are equal, round, and reactive to light  Cardiovascular:      Rate and Rhythm: Normal rate and regular rhythm  Heart sounds: Normal heart sounds  Pulmonary:      Effort: Pulmonary effort is normal       Breath sounds: Normal breath sounds  Abdominal:      General: Abdomen is flat  Bowel sounds are normal       Palpations: Abdomen is soft  Tenderness: There is abdominal tenderness in the right upper quadrant, epigastric area and suprapubic area  Neurological:      Mental Status: She is alert  Vital Signs  ED Triage Vitals [03/05/23 1014]   Temperature Pulse Respirations Blood Pressure SpO2   98 7 °F (37 1 °C) 80 18 147/80 99 %      Temp Source Heart Rate Source Patient Position - Orthostatic VS BP Location FiO2 (%)   Tympanic Monitor Sitting Right arm --      Pain Score       8           Vitals:    03/05/23 1014 03/05/23 1447   BP: 147/80 137/84   Pulse: 80 66   Patient Position - Orthostatic VS: Sitting Lying         Visual Acuity      ED Medications  Medications   sodium chloride 0 9 % infusion (0 mL/hr Intravenous Stopped 3/5/23 1512)   ketorolac (TORADOL) injection 30 mg (30 mg Intravenous Given 3/5/23 1147)   iohexol (OMNIPAQUE) 350 MG/ML injection (SINGLE-DOSE) 100 mL (100 mL Intravenous Given 3/5/23 1157)       Diagnostic Studies  Results Reviewed     Procedure Component Value Units Date/Time    Chlamydia/GC amplified DNA by PCR [[id number]] Collected: 03/05/23 1314    Lab Status: In process Specimen: Urine, Other Updated: 03/05/23 1457    Molecular Vaginal Panel [[id number]] Collected: 03/05/23 1428    Lab Status:  In process Specimen: Genital from Vaginal Updated: 03/05/23 1457    HS Troponin I 4hr [[id number]]     Lab Status: No result Specimen: Blood     Urine Microscopic [[id number]]  (Normal) Collected: 03/05/23 1057    Lab Status: Final result Specimen: Urine, Clean Catch Updated: 03/05/23 1134     RBC, UA 2-4 /hpf      WBC, UA None Seen /hpf      Epithelial Cells Occasional /hpf      Bacteria, UA Occasional /hpf     HS Troponin 0hr (reflex protocol) [[id number]]  (Normal) Collected: 03/05/23 1057    Lab Status: Final result Specimen: Blood from Arm, Left Updated: 03/05/23 1130     hs TnI 0hr 2 ng/L     HS Troponin I 2hr [[id number]]     Lab Status: No result Specimen: Blood     Comprehensive metabolic panel [[id number]] Collected: 03/05/23 1057    Lab Status: Final result Specimen: Blood from Arm, Left Updated: 03/05/23 1119     Sodium 141 mmol/L      Potassium 4 3 mmol/L      Chloride 108 mmol/L      CO2 28 mmol/L      ANION GAP 5 mmol/L      BUN 21 mg/dL      Creatinine 0 97 mg/dL      Glucose 99 mg/dL      Calcium 8 9 mg/dL      AST 17 U/L      ALT 15 U/L      Alkaline Phosphatase 96 U/L      Total Protein 7 3 g/dL      Albumin 4 0 g/dL      Total Bilirubin 0 57 mg/dL      eGFR 68 ml/min/1 73sq m     Narrative:      [place] guidelines for Chronic Kidney Disease (CKD):   •  Stage 1 with normal or high GFR (GFR > 90 mL/min/1 73 square meters)  •  Stage 2 Mild CKD (GFR = 60-89 mL/min/1 73 square meters)  •  Stage 3A Moderate CKD (GFR = 45-59 mL/min/1 73 square meters)  •  Stage 3B Moderate CKD (GFR = 30-44 mL/min/1 73 square meters)  •  Stage 4 Severe CKD (GFR = 15-29 mL/min/1 73 square meters)  •  Stage 5 End Stage CKD (GFR <15 mL/min/1 73 square meters)  Note: GFR calculation is accurate only with a steady state creatinine    Lipase [[id number]]  (Normal) Collected: 03/05/23 1057    Lab Status: Final result Specimen: Blood from Arm, Left Updated: 03/05/23 1119     Lipase 138 u/L     UA w Reflex to Microscopic w Reflex to Culture [[id number]]  (Abnormal) Collected: 03/05/23 1057    Lab Status: Final result Specimen: Urine, Clean Catch Updated: 03/05/23 1112     Color, UA Yellow     Clarity, UA Clear     Specific [place], UA 1 020     pH, UA 7 0     Leukocytes, UA Negative     Nitrite, UA Negative     Protein, UA Negative mg/dl      Glucose, UA Negative mg/dl      Ketones, UA Negative mg/dl      Bilirubin, UA Negative     Occult Blood, UA 25 0     UROBILINOGEN UA Negative mg/dL     CBC and differential [[id number]]  (Abnormal) Collected: 03/05/23 1057    Lab Status: Final result Specimen: Blood from Arm, Left Updated: 03/05/23 1111     WBC 7 74 Thousand/uL      RBC 4 36 Million/uL      Hemoglobin 13 4 g/dL      Hematocrit 42 3 %      MCV 97 fL      MCH 30 7 pg      MCHC 31 7 g/dL      RDW 12 5 %      MPV 10 0 fL      Platelets 166 Thousands/uL      nRBC 0 /100 WBCs      Neutrophils Relative 54 %      Immat GRANS % 0 %      Lymphocytes Relative 29 %      Monocytes Relative 7 %      Eosinophils Relative 10 %      Basophils Relative 0 %      Neutrophils Absolute 4 16 Thousands/µL      Immature Grans Absolute 0 02 Thousand/uL      Lymphocytes Absolute 2 27 Thousands/µL      Monocytes Absolute 0 50 Thousand/µL      Eosinophils Absolute 0 76 Thousand/µL      Basophils Absolute 0 03 Thousands/µL     POCT pregnancy, urine [[id number]]  (Normal) Resulted: 03/05/23 1058    Lab Status: Final result Updated: 03/05/23 1104     EXT Preg Test, Ur Negative     Control Valid                 US pelvis complete w transvaginal   Final Result by Shalini Christy MD (03/05 1433)       1  Endometrium measures 8 mm which is abnormal for a postmenopausal patient  Additional evaluation necessary   2  Nabothian cysts with no sonographic evidence for cervical mass  Correlate with exam and Pap smear and if necessary MRI pelvis  3   1 4 cm simple right ovarian cyst    O-RADS 2, almost certainly benign  No follow-up necessary   4  Cystic areas in the myometrium which could be secondary to adenomyosis or cystic degeneration of myomas     The study was marked in [facility] for immediate notification and follow-up  Workstation performed: [id number]         CT abdomen pelvis with contrast   Final Result by Alexandra León MD (03/05 1235)      Enlarged heterogeneous cervix with fluid in the endometrial canal   Differential considerations include cervicitis or cervical cancer  Gynecological examination is recommended  This may be further evaluated with pelvic ultrasound  The study was marked in [facility] for immediate notification  Workstation performed: [id number]                    Procedures  Procedures         ED Course                               SBIRT 20yo+    Flowsheet Row Most Recent Value   SBIRT (25 yo +)    In order to provide better care to our patients, we are screening all of our patients for alcohol and drug use  Would it be okay to ask you these screening questions? No Filed at: 03/05/2023 1018                    MDM    Disposition  Final diagnoses:   Generalized abdominal pain   Chronic pain   Fibroid   Thickened endometrium     Time reflects when diagnosis was documented in both MDM as applicable and the Disposition within this note     Time User Action Codes Description Comment    3/5/2023  3:28 PM Orellana Tata  Add [R10 9] Abdominal pain     3/5/2023  3:28 PM Orellana Tata  Add [R10 84] Generalized abdominal pain     3/5/2023  3:28 PM Orellana Tata  Modify [R10 84] Generalized abdominal pain     3/5/2023  3:28 PM Orellana Tata  Remove [R10 9] Abdominal pain     3/5/2023  3:28 PM Orellana Tata  Add [G89 29] Chronic pain     3/5/2023  3:29 PM Orellana Tata  Add [D21 9] Fibroid     3/5/2023  3:29 PM Orellana Tata  Add [R93 89] Thickened endometrium       ED Disposition     ED Disposition   Discharge    Condition   Stable    Date/Time   Sun Mar 5, 2023  3:29 PM    Comment   Natalio Guillen discharge to home/self care                 Follow-up Information     Follow up With Specialties Details Why Contact Info    Ob/Gyn Dinh Christy [address] [facility]  recheck with ob/gyn doctor for further evaluation uterus evaluation and pap smear evaluation poosible biopsy [address]  [address] [place] [address]  [phone]            Discharge Medication List as of 3/5/2023  3:42 PM      START taking these medications    Details   ondansetron (ZOFRAN) 4 mg tablet Take 1 tablet (4 mg total) by mouth every 8 (eight) hours as needed for nausea or vomiting, Starting Sun 3/5/2023, Print         CONTINUE these medications which have CHANGED    Details   dicyclomine (BENTYL) 10 mg capsule Take 1 capsule (10 mg total) by mouth 2 (two) times a day for 5 days, Starting Sun 3/5/2023, Until Fri 3/10/2023, Print      ibuprofen (MOTRIN) 600 mg tablet Take 1 tablet (600 mg total) by mouth every 6 (six) hours as needed for moderate pain, Starting Sun 3/5/2023, Print      omeprazole (PriLOSEC) 20 mg delayed release capsule Take 1 capsule (20 mg total) by mouth daily, Starting Sun 3/5/2023, Print         CONTINUE these medications which have NOT CHANGED    Details   albuterol (PROVENTIL HFA,VENTOLIN HFA) 90 mcg/act inhaler Inhale 2 puffs every 6 (six) hours as needed for wheezing, Starting Tue 8/9/2022, Normal      dicyclomine (BENTYL) 20 mg tablet Take 1 tablet (20 mg total) by mouth 2 (two) times a day as needed (abdominal pain), Starting Mon 1/18/2021, Normal      lidocaine (LIDODERM) 5 % Apply 1 patch topically daily Remove & Discard patch within 12 hours or as directed by MD, Starting Fri 5/14/2021, Normal      meloxicam (Mobic) 15 mg tablet Take 1 tablet (15 mg total) by mouth daily, Starting Mon 2/21/2022, Normal      methocarbamol (ROBAXIN) 500 mg tablet Take 1 tablet (500 mg total) by mouth 2 (two) times a day, Starting Fri 5/14/2021, Normal      ondansetron (ZOFRAN-ODT) 4 mg disintegrating tablet Take 1 tablet (4 mg total) by mouth every 8 (eight) hours as needed for nausea or vomiting for up to 12 doses, Starting Thu 12/29/2022, Normal      polyethylene glycol (MIRALAX) 17 g packet Take 17 g by mouth daily for 7 days, Starting Sun 11/27/2022, Until Sun 12/4/2022, Normal      sucralfate (CARAFATE) 1 g tablet Take 1 tablet (1 g total) by mouth 4 (four) times a day for 14 days, Starting Thu 12/29/2022, Until Thu 1/12/2023, Normal             No discharge procedures on file      PDMP Review     None          ED Provider  Electronically Signed by           Richard Kan PA-C  03/05/23 4147

## 2023-03-06 LAB
ATRIAL RATE: 76 BPM
C GLABRATA DNA VAG QL NAA+PROBE: NEGATIVE
C KRUSEI DNA VAG QL NAA+PROBE: NEGATIVE
C TRACH DNA SPEC QL NAA+PROBE: NEGATIVE
CANDIDA SP 6 PNL VAG NAA+PROBE: NEGATIVE
N GONORRHOEA DNA SPEC QL NAA+PROBE: NEGATIVE
P AXIS: 28 DEGREES
PR INTERVAL: 124 MS
QRS AXIS: 26 DEGREES
QRSD INTERVAL: 76 MS
QT INTERVAL: 394 MS
QTC INTERVAL: 443 MS
T VAGINALIS DNA VAG QL NAA+PROBE: NEGATIVE
T WAVE AXIS: 17 DEGREES
VAGINOSIS/ITIS DNA PNL VAG PROBE+SIG AMP: NEGATIVE
VENTRICULAR RATE: 76 BPM

## 2023-03-09 ENCOUNTER — OFFICE VISIT (OUTPATIENT)
Dept: OBGYN CLINIC | Facility: CLINIC | Age: 53
End: 2023-03-09

## 2023-03-09 VITALS
WEIGHT: 156.8 LBS | SYSTOLIC BLOOD PRESSURE: 135 MMHG | HEART RATE: 76 BPM | BODY MASS INDEX: 29.6 KG/M2 | DIASTOLIC BLOOD PRESSURE: 87 MMHG | HEIGHT: 61 IN

## 2023-03-09 DIAGNOSIS — Z13.31 POSITIVE DEPRESSION SCREENING: Primary | ICD-10-CM

## 2023-03-09 DIAGNOSIS — R93.89 THICKENED ENDOMETRIUM: ICD-10-CM

## 2023-03-09 NOTE — PATIENT INSTRUCTIONS
Momo por hough confianza en nuestro equipo  [facility] y agradecemos jose alejandro comentarios  Si recibe purvi encuesta nuestra, tómese unos momentos para informarnos cómo estamos     Sinceramente,  Anne Javon, DO

## 2023-03-09 NOTE — PROGRESS NOTES
PROBLEM GYNECOLOGICAL VISIT    Vera Arroyo is a 50 y o  female who presents today for followup  Her general medical history has been reviewed and she reports it as follows:    Past Medical History:   Diagnosis Date   • Asthma    • Colitis      Past Surgical History:   Procedure Laterality Date   • ABDOMINAL SURGERY     • TUBAL LIGATION       OB History    No obstetric history on file  Social History     Tobacco Use   • Smoking status: Never   • Smokeless tobacco: Never   Vaping Use   • Vaping Use: Never used   Substance Use Topics   • Alcohol use: Not Currently   • Drug use: No     Social History     Substance and Sexual Activity   Sexual Activity Not on file       Current Outpatient Medications   Medication Instructions   • albuterol (PROVENTIL HFA,VENTOLIN HFA) 90 mcg/act inhaler 2 puffs, Inhalation, Every 6 hours PRN   • dicyclomine (BENTYL) 20 mg, Oral, 2 times daily PRN   • dicyclomine (BENTYL) 10 mg, Oral, 4 times daily (before meals and at bedtime)   • dicyclomine (BENTYL) 10 mg, Oral, 2 times daily   • ibuprofen (MOTRIN) 400 mg, Oral, Every 6 hours PRN   • ibuprofen (MOTRIN) 600 mg, Oral, Every 6 hours PRN   • lidocaine (LIDODERM) 5 % 1 patch, Topical, Daily, Remove & Discard patch within 12 hours or as directed by MD   • meloxicam (MOBIC) 15 mg, Oral, Daily   • methocarbamol (ROBAXIN) 500 mg, Oral, 2 times daily   • omeprazole (PRILOSEC) 40 mg, Oral, Daily   • omeprazole (PRILOSEC) 20 mg, Oral, Daily   • ondansetron (ZOFRAN) 4 mg, Oral, Every 8 hours PRN   • ondansetron (ZOFRAN-ODT) 4 mg, Oral, Every 8 hours PRN   • polyethylene glycol (MIRALAX) 17 g, Oral, Daily   • sucralfate (CARAFATE) 1 g, Oral, 4 times daily       History of Present Illness:   Patient presents for followup s/p ER visit for abdominal pain was noted to have a thickened endometrium on pelvic sonogram   Patient states will be moving to [facility] this coming Sunday      Review of Systems:  Review of Systems   All other systems reviewed and are negative  Physical Exam:  /87   Pulse 76   Ht 5' 1" (1 549 m)   Wt 71 1 kg (156 lb 12 8 oz)   LMP  (LMP Unknown) Comment: tubal ligation per patient  BMI 29 63 kg/m²   Physical Exam  Constitutional:       Appearance: Normal appearance  Neurological:      Mental Status: She is alert  Vitals reviewed  Discussion:  Discuss with patient and her daughter recommend an EMB for the thickened endometrium because she is postmenopausal but has not had any PMB which a normal stripe should be 1-6mm and hers is 8mm  Recommend when patient get a Gyn and PCP to request her records [facility]  Assessment:   1  Patient relocating this coming Sunday to [facility]   2  Thickened endometrium        Plan:   1  Patient establish care and [facility]   2  Return to office prm  3  Patient's depression screening was assessed with a PHQ-2 score of 2  Their PHQ-9 score was 10  Referral placed for  consultation  Patient is moving to [facility] and will see  there  Reviewed with patient that test results are available in [facility] immediately, but that they will not necessarily be reviewed by me immediately  Explained that I will review results at my earliest opportunity and contact patient appropriately

## 2023-04-28 ENCOUNTER — PATIENT OUTREACH (OUTPATIENT)
Dept: OBGYN CLINIC | Facility: CLINIC | Age: 53
End: 2023-04-28

## 2023-05-01 NOTE — PROGRESS NOTES
ELIANA DALE outreached pt today for a referral for pts high PHQ-9 at her last GYN visit  Pt did answr the phone, ELIANA DALE utilized the [employer] interpretor to complete the call  Pt reports that she moved to [place] with her significant other and his family  She and her s/o are currently living in an RV  She reports she has been very depressed recently due to her ailing health  She is having issues with her esophagus and kidneys, she reports she is having a surgery tomorrow for something else, she has been feeling very frail and weak  She denies any SI/HI  She reports she does not currently have health insurance in [place] because she can not qualify while living in an   She would like to start [place] [address] services but is not sure where to turn  ELIANA DALE enc her to outreach to her primary care physician for any advice, ELIANA Dale also offered to look up [facility] in that area for her to go to for care, pt agreeable to any resources that can be provided  Current address:  [address] [place] [address],[address] [facility]    Email: [email or web address]      ELIANA DALE looked up [facility] in her area and sen there information for the [place] [address] PCP office which does have integrated behavioral health  ELIANA DALE enc the pt that if she comes back to [place] to live she can continue to come to use for care  Pt expressed understanding and she was thankful for the call today  ELIANA DALE will close this encounter at this time as pt lives outside of the state